# Patient Record
Sex: MALE | Race: WHITE | NOT HISPANIC OR LATINO | URBAN - METROPOLITAN AREA
[De-identification: names, ages, dates, MRNs, and addresses within clinical notes are randomized per-mention and may not be internally consistent; named-entity substitution may affect disease eponyms.]

---

## 2024-01-28 ENCOUNTER — APPOINTMENT (OUTPATIENT)
Dept: CARDIOLOGY | Facility: HOSPITAL | Age: 29
End: 2024-01-28

## 2024-01-28 ENCOUNTER — HOSPITAL ENCOUNTER (EMERGENCY)
Facility: HOSPITAL | Age: 29
Discharge: HOME | End: 2024-01-28
Attending: EMERGENCY MEDICINE
Payer: MEDICAID

## 2024-01-28 VITALS
SYSTOLIC BLOOD PRESSURE: 164 MMHG | TEMPERATURE: 97.7 F | DIASTOLIC BLOOD PRESSURE: 87 MMHG | OXYGEN SATURATION: 98 % | BODY MASS INDEX: 19.13 KG/M2 | RESPIRATION RATE: 15 BRPM | WEIGHT: 121.91 LBS | HEIGHT: 67 IN | HEART RATE: 70 BPM

## 2024-01-28 DIAGNOSIS — F14.10 COCAINE ABUSE (MULTI): ICD-10-CM

## 2024-01-28 DIAGNOSIS — F32.A DEPRESSION, UNSPECIFIED DEPRESSION TYPE: Primary | ICD-10-CM

## 2024-01-28 LAB
ALBUMIN SERPL-MCNC: 4.9 G/DL (ref 3.5–5)
ALP BLD-CCNC: 64 U/L (ref 35–125)
ALT SERPL-CCNC: 23 U/L (ref 5–40)
AMPHETAMINES UR QL SCN>1000 NG/ML: NEGATIVE
ANION GAP SERPL CALC-SCNC: 11 MMOL/L
APPEARANCE UR: CLEAR
AST SERPL-CCNC: 23 U/L (ref 5–40)
BARBITURATES UR QL SCN>300 NG/ML: NEGATIVE
BASOPHILS # BLD AUTO: 0.02 X10*3/UL (ref 0–0.1)
BASOPHILS NFR BLD AUTO: 0.3 %
BENZODIAZ UR QL SCN>300 NG/ML: NEGATIVE
BILIRUB SERPL-MCNC: 0.5 MG/DL (ref 0.1–1.2)
BILIRUB UR STRIP.AUTO-MCNC: NEGATIVE MG/DL
BUN SERPL-MCNC: 12 MG/DL (ref 8–25)
BZE UR QL SCN>300 NG/ML: POSITIVE
CALCIUM SERPL-MCNC: 9.7 MG/DL (ref 8.5–10.4)
CANNABINOIDS UR QL SCN>50 NG/ML: POSITIVE
CHLORIDE SERPL-SCNC: 104 MMOL/L (ref 97–107)
CO2 SERPL-SCNC: 25 MMOL/L (ref 24–31)
COLOR UR: YELLOW
CREAT SERPL-MCNC: 1.1 MG/DL (ref 0.4–1.6)
EGFRCR SERPLBLD CKD-EPI 2021: >90 ML/MIN/1.73M*2
EOSINOPHIL # BLD AUTO: 0.06 X10*3/UL (ref 0–0.7)
EOSINOPHIL NFR BLD AUTO: 1 %
ERYTHROCYTE [DISTWIDTH] IN BLOOD BY AUTOMATED COUNT: 12.4 % (ref 11.5–14.5)
FENTANYL+NORFENTANYL UR QL SCN: NEGATIVE
FLUAV RNA RESP QL NAA+PROBE: NOT DETECTED
FLUBV RNA RESP QL NAA+PROBE: NOT DETECTED
GLUCOSE SERPL-MCNC: 88 MG/DL (ref 65–99)
GLUCOSE UR STRIP.AUTO-MCNC: NORMAL MG/DL
HCT VFR BLD AUTO: 46.3 % (ref 41–52)
HGB BLD-MCNC: 16.1 G/DL (ref 13.5–17.5)
IMM GRANULOCYTES # BLD AUTO: 0.02 X10*3/UL (ref 0–0.7)
IMM GRANULOCYTES NFR BLD AUTO: 0.3 % (ref 0–0.9)
KETONES UR STRIP.AUTO-MCNC: NEGATIVE MG/DL
LEUKOCYTE ESTERASE UR QL STRIP.AUTO: NEGATIVE
LYMPHOCYTES # BLD AUTO: 1.28 X10*3/UL (ref 1.2–4.8)
LYMPHOCYTES NFR BLD AUTO: 21.7 %
MCH RBC QN AUTO: 31 PG (ref 26–34)
MCHC RBC AUTO-ENTMCNC: 34.8 G/DL (ref 32–36)
MCV RBC AUTO: 89 FL (ref 80–100)
METHADONE UR QL SCN>300 NG/ML: NEGATIVE
MONOCYTES # BLD AUTO: 0.32 X10*3/UL (ref 0.1–1)
MONOCYTES NFR BLD AUTO: 5.4 %
MUCOUS THREADS #/AREA URNS AUTO: NORMAL /LPF
NEUTROPHILS # BLD AUTO: 4.21 X10*3/UL (ref 1.2–7.7)
NEUTROPHILS NFR BLD AUTO: 71.3 %
NITRITE UR QL STRIP.AUTO: NEGATIVE
NRBC BLD-RTO: 0 /100 WBCS (ref 0–0)
OPIATES UR QL SCN>300 NG/ML: NEGATIVE
OXYCODONE UR QL: NEGATIVE
PCP UR QL SCN>25 NG/ML: NEGATIVE
PH UR STRIP.AUTO: 6 [PH]
PLATELET # BLD AUTO: 256 X10*3/UL (ref 150–450)
POTASSIUM SERPL-SCNC: 3.9 MMOL/L (ref 3.4–5.1)
PROT SERPL-MCNC: 7.8 G/DL (ref 5.9–7.9)
PROT UR STRIP.AUTO-MCNC: NORMAL MG/DL
RBC # BLD AUTO: 5.2 X10*6/UL (ref 4.5–5.9)
RBC # UR STRIP.AUTO: NEGATIVE /UL
RBC #/AREA URNS AUTO: NORMAL /HPF
SARS-COV-2 RNA RESP QL NAA+PROBE: NOT DETECTED
SODIUM SERPL-SCNC: 140 MMOL/L (ref 133–145)
SP GR UR STRIP.AUTO: 1.03
UROBILINOGEN UR STRIP.AUTO-MCNC: NORMAL MG/DL
WBC # BLD AUTO: 5.9 X10*3/UL (ref 4.4–11.3)
WBC #/AREA URNS AUTO: NORMAL /HPF

## 2024-01-28 PROCEDURE — 85025 COMPLETE CBC W/AUTO DIFF WBC: CPT | Performed by: EMERGENCY MEDICINE

## 2024-01-28 PROCEDURE — 99285 EMERGENCY DEPT VISIT HI MDM: CPT | Performed by: EMERGENCY MEDICINE

## 2024-01-28 PROCEDURE — 36415 COLL VENOUS BLD VENIPUNCTURE: CPT | Performed by: EMERGENCY MEDICINE

## 2024-01-28 PROCEDURE — 80307 DRUG TEST PRSMV CHEM ANLYZR: CPT | Performed by: EMERGENCY MEDICINE

## 2024-01-28 PROCEDURE — 99283 EMERGENCY DEPT VISIT LOW MDM: CPT | Mod: 27

## 2024-01-28 PROCEDURE — 80053 COMPREHEN METABOLIC PANEL: CPT | Performed by: EMERGENCY MEDICINE

## 2024-01-28 PROCEDURE — 81001 URINALYSIS AUTO W/SCOPE: CPT | Performed by: EMERGENCY MEDICINE

## 2024-01-28 PROCEDURE — 93005 ELECTROCARDIOGRAM TRACING: CPT

## 2024-01-28 PROCEDURE — 87636 SARSCOV2 & INF A&B AMP PRB: CPT | Performed by: EMERGENCY MEDICINE

## 2024-01-28 SDOH — HEALTH STABILITY: MENTAL HEALTH: ARE YOU HAVING THOUGHTS OF KILLING YOURSELF RIGHT NOW?: NO

## 2024-01-28 SDOH — HEALTH STABILITY: MENTAL HEALTH: ACTIVE SUICIDAL IDEATION WITH SOME INTENT TO ACT, WITHOUT SPECIFIC PLAN (PAST 1 MONTH): NO

## 2024-01-28 SDOH — HEALTH STABILITY: MENTAL HEALTH: IN THE PAST FEW WEEKS, HAVE YOU FELT THAT YOU OR YOUR FAMILY WOULD BE BETTER OFF IF YOU WERE DEAD?: YES

## 2024-01-28 SDOH — HEALTH STABILITY: MENTAL HEALTH: ACTIVE SUICIDAL IDEATION WITH SPECIFIC PLAN AND INTENT (PAST 1 MONTH): NO

## 2024-01-28 SDOH — ECONOMIC STABILITY: HOUSING INSECURITY: FEELS SAFE LIVING IN HOME: YES

## 2024-01-28 SDOH — HEALTH STABILITY: MENTAL HEALTH: IN THE PAST WEEK, HAVE YOU BEEN HAVING THOUGHTS ABOUT KILLING YOURSELF?: YES

## 2024-01-28 SDOH — HEALTH STABILITY: MENTAL HEALTH: ANXIETY SYMPTOMS: GENERALIZED

## 2024-01-28 SDOH — HEALTH STABILITY: MENTAL HEALTH: WHEN DID YOU TRY TO KILL YOURSELF?: 2019

## 2024-01-28 SDOH — HEALTH STABILITY: MENTAL HEALTH: HOW DID YOU TRY TO KILL YOURSELF?: OVERDOSE

## 2024-01-28 SDOH — HEALTH STABILITY: MENTAL HEALTH: SUICIDAL BEHAVIOR (DESCRIPTION): OVERDOSE ON PILLS

## 2024-01-28 SDOH — HEALTH STABILITY: MENTAL HEALTH: HAVE YOU EVER TRIED TO KILL YOURSELF?: YES

## 2024-01-28 SDOH — HEALTH STABILITY: MENTAL HEALTH: NON-SPECIFIC ACTIVE SUICIDAL THOUGHTS (PAST 1 MONTH): YES

## 2024-01-28 SDOH — HEALTH STABILITY: MENTAL HEALTH: SUICIDAL BEHAVIOR (3 MONTHS): NO

## 2024-01-28 SDOH — HEALTH STABILITY: MENTAL HEALTH: DEPRESSION SYMPTOMS: ISOLATIVE;INCREASED IRRITABILITY

## 2024-01-28 SDOH — HEALTH STABILITY: MENTAL HEALTH: WISH TO BE DEAD (PAST 1 MONTH): YES

## 2024-01-28 SDOH — HEALTH STABILITY: MENTAL HEALTH: IN THE PAST FEW WEEKS, HAVE YOU WISHED YOU WERE DEAD?: YES

## 2024-01-28 SDOH — HEALTH STABILITY: MENTAL HEALTH: SUICIDAL BEHAVIOR (LIFETIME): YES

## 2024-01-28 ASSESSMENT — PAIN - FUNCTIONAL ASSESSMENT: PAIN_FUNCTIONAL_ASSESSMENT: 0-10

## 2024-01-28 ASSESSMENT — COLUMBIA-SUICIDE SEVERITY RATING SCALE - C-SSRS
1. IN THE PAST MONTH, HAVE YOU WISHED YOU WERE DEAD OR WISHED YOU COULD GO TO SLEEP AND NOT WAKE UP?: YES
5. HAVE YOU STARTED TO WORK OUT OR WORKED OUT THE DETAILS OF HOW TO KILL YOURSELF? DO YOU INTEND TO CARRY OUT THIS PLAN?: NO
6. HAVE YOU EVER DONE ANYTHING, STARTED TO DO ANYTHING, OR PREPARED TO DO ANYTHING TO END YOUR LIFE?: NO
2. HAVE YOU ACTUALLY HAD ANY THOUGHTS OF KILLING YOURSELF?: YES
4. HAVE YOU HAD THESE THOUGHTS AND HAD SOME INTENTION OF ACTING ON THEM?: NO

## 2024-01-28 ASSESSMENT — PAIN SCALES - GENERAL: PAINLEVEL_OUTOF10: 0 - NO PAIN

## 2024-01-28 ASSESSMENT — LIFESTYLE VARIABLES
SUBSTANCE_ABUSE_PAST_12_MONTHS: YES
PRESCIPTION_ABUSE_PAST_12_MONTHS: NO

## 2024-01-28 NOTE — PROGRESS NOTES
"EPAT - Social Work Psychiatric Assessment    Arrival Details  Mode of Arrival: Ambulatory (Escorted by [facility])  Admission Source: Home  Admission Type: Involuntary (Pink slip written by [facility])  EPAT Assessment Start Date: 01/28/24  EPAT Assessment Start Time: 1042  Name of : MUSTAPHA Tadeo    History of Present Illness  Admission Reason: SI  HPI: Pt is a 29 y/o M brought in by police after making SI statements to sister over the phone. Per report sister called 911 stating she was concerned pt was unsafe with self. Per police pt had reported to them that he took several unknown pills because he \"wanted the pain to stop\". Pt admitted SI to RN upon arrival to ED but denied any attempt or plan today. Social work reviewed Pts chart, medical record, and provider notes which indicate history of depression, anxiety, PTSD, NISHA and SI with a past attempt in 2019. Pt has been hospitalized several times since 2018 for SI. He has been with outpatient providers in the past but denies any current. Pt states he has been off medication since 2019. The triage risk assessment was reviewed and Pt was inidcated to be moderate risk during triage assessment.    SW Readmission Information   Readmission within 30 Days: No    Psychiatric Symptoms  Anxiety Symptoms: Generalized  Depression Symptoms: Isolative, Increased irritability  Tabitha Symptoms: No problems reported or observed.    Psychosis Symptoms  Hallucination Type: No problems reported or observed.  Delusion Type: No problems reported or observed.    Additional Symptoms - Adult  Generalized Anxiety Disorder: No problems reported or observed.  Obsessive Compulsive Disorder: No problems reported or observed.  Panic Attack: No problems reported or observed.  Post Traumatic Stress Disorder: No problems reported or observed.  Delirium: No problems reported or observed.    Past Psychiatric History/Meds/Treatments  Past Psychiatric History: Pt has been diagnosed with depression, " anxiety, PTSD and has history with substance use. Pt reports providers have tried to diagnose him with schizoprenia however he denies any history or current symptoms of the diagnosis.  Past Psychiatric Meds/Treatments: Pt has been hospitalized several times in the past. Most recent admission per records is to [facility] in August of 2022 for SI. Pt has also been to [facility], [facility] and [facility] between 2018 and 2019.  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 He has history of cocaine, marijuana, opioids and alcohol use.)  Have you used any prescription drugs other than prescribed in the past 12 months?: No  Is a toxicology screen needed?: Yes    Stage of Change  Stage of Change: Precontemplation  History of Treatment: Other (Comment) (admits to seeking outpatient treatment in the past.)  Type of Treatment Offered: Inpatient  Treatment Offered:  "Declined  Duration of Substance Use: Several years  Frequency of Substance Use: Pt reports occassional use    Psychosocial  Psychosocial (WDL): Within Defined Limits  Affect: Appropriate to circumstances    Orientation  Orientation Level: Oriented X4    General Appearance  Motor Activity: Unremarkable  Speech Pattern: Other (Comment) (Clear)  General Attitude: Cooperative, Guarded  Appearance/Hygiene: Unremarkable    Thought Process  Coherency: Circumstantial  Content: Unremarkable  Perception: Not altered  Hallucination: None  Judgment/Insight: Poor  Confusion: None  Cognition: Poor judgement    Sleep Pattern  Sleep Pattern: Unable to assess    Risk Factors  Self Harm/Suicidal Ideation Plan: Pt admitting to making statements to sister over the phone but minimizing thoughts at this time. Pt denies any plan however there was report of pt making comments to police about taking unknown pills.  Previous Self Harm/Suicidal Plans: Pt has history of SI and a past attempt by overdose on Xanax in 2019.  Risk Factors: Lower socioeconomic status, Major mental illness, Male, Substance abuse  Description of Thoughts/Ideas Leaving Unit Now: Pt not able to guarantee safety at this time stating he is normally by himself and this is 'how he prefers it\". Pt not allowing staff to call sister or roommate for collateral information or safety planning.    Violence Risk Assessment  Assessment of Violence: None noted  Thoughts of Harm to Others: No    Ability to Assess Risk Screen  Risk Screen - Ability to Assess: Able to be screened  Ask Suicide-Screening Questions  1. In the past few weeks, have you wished you were dead?: Yes  2. In the past few weeks, have you felt that you or your family would be better off if you were dead?: Yes  3. In the past week, have you been having thoughts about killing yourself?: Yes  4. Have you ever tried to kill yourself?: Yes  How did you try to kill yourself?: Overdose  When did you try to kill yourself?: " 2019  5. Are you having thoughts of killing yourself right now?: No  Calculated Risk Score: Potential Risk  Thomas Suicide Severity Rating Scale (Screener/Recent Self-Report)  1. Wish to be Dead (Past 1 Month): Yes  2. Non-Specific Active Suicidal Thoughts (Past 1 Month): Yes  3. Active Suicidal Ideation with any Methods (Not Plan) Without Intent to Act (Past 1 Month): Yes  4. Active Suicidal Ideation with Some Intent to Act, Without Specific Plan (Past 1 Month): No  5. Active Suicidal Ideation with Specific Plan and Intent (Past 1 Month): No  6. Suicidal Behavior (Lifetime): Yes  6. Suicidal Behavior (3 Months): No  6. Suicidal Behavior (Description): Overdose on pills  Calculated C-SSRS Risk Score (Lifetime/Recent): Moderate Risk  Step 1: Risk Factors  Current & Past Psychiatric Dx: Mood disorder, Alcohol/substance abuse disorders, PTSD  Presenting Symptoms: Impulsivity, Anxiety and/or panic  Precipitants/Stressors: Triggering events leading to humiliation, shame, and/or despair (e.g. loss of relationship, financial or health status) (real or anticipated), Substance intoxication or withdrawal, Inadequate social supports, Social isolation  Change in Treatment: Non-compliant or not receiving treatment  Access to Lethal Methods : No  Step 2: Protective Factors   Protective Factors Internal: Identifies reasons for living  Protective Factors External: Engaged in work or school  Step 3: Suicidal Ideation Intensity  How Many Times Have You Had These Thoughts: 2-5 times in a week  When You Have the Thoughts How Long do They Last : Less than 1 hour/some of the time  Could/Can You Stop Thinking About Killing Yourself or Wanting to Die if You Want to: Can control thoughts with some difficulty  Are There Things - Anyone or Anything - That Stopped You From Wanting to Die or Acting on: Uncertain that deterrents stopped you  What Sort of Reasons Did You Have For Thinking About Wanting to Die or Killing Yourself: Mostly to end  "or stop the pain (you couldn't go on living with the pain or how you were feeling)  Total Score: 15  Step 5: Documentation  Risk Level: Moderate suicide risk    Psychiatric Impression and Plan of Care  Assessment and Plan: Upon assessment Pt presents calm but frustrated with situation. Pt admits to making statements to sister over the phone of \"being tired of life\" and \"wanting the pain to stop\". Pt states he got into an argument with some girl recently and made comments \"out of anger\" and was trying to vent to his sister which \"was the wrong thing to do\". Pt admits he has been off Abilify since 2019 after he was incarcerated and has not gotten back on it or any medications since because he \"wants to try and be mentally stable on his own\". Pt reports he has been diagnosed with depression, anxiety, and PTSD. He reports providers have also tried to diagnosis him with schizophrenia because \"they believed I had symptoms of hallucinatiosns\" however pt denies this stating the only thing he has ever experienced were \"his own thoughts in his head\". Pt denies ever experiencing hallucinations. Pt admits he has been feeling depressed latelt but states these symtoms are \"not going to change\". Pt minimizing SI statements and reports that he never told police he took pills and believes it was his sister that made this statement to police. Pt denies SI at time of assessment and continues to minimize symptoms while in the ED. Pt repeatedly stating he wants to go home because he \"wants to be able to get to work later tonight\". Pt refusing to allow social work to contact sister or roommate for collateral information or safety planning stating his sister is the one who called police on him and his roommate is never home due to opposite shifts at work and the fact that his roommate travels at times fot work. Pt denies HI/AH/VH but continues to minimize SI and is unable to guarantee safety at this time. He presents with moderate to high " risk at time of social work assessment and plan will be to refer pt to inpatient placement for safety and stabilization.  Specific Resources Provided to Patient: Peer support unavailable.    Outcome/Disposition  Patient's Perception of Outcome Achieved: Pt disagrees with inpatient treatment.  Assessment, Recommendations and Risk Level Reviewed with: Reviewed case with Dr. Martins and JUAN CARLOS Alvarado. Due to pt's lack of safety at this time all parties agree for inpatient placement.  EPAT Assessment Completed Date: 01/28/24  EPAT Assessment Completed Time: 1058  Patient Disposition: Out of network facility (Specify)  Out of Network Reason: Insurance not accepted at  facility (Pt is uninsured at this time and will be [facility] to [facility].)    Social Work Note

## 2024-01-28 NOTE — ED PROVIDER NOTES
"HPI   Chief Complaint   Patient presents with    Suicidal     Pt stated that his sister called 911 because she was nervous that he was going to hurt himself. He stated that he is SI but he does not have a plan. He stated that he does \"not have the balls to actually do it\". He stated that he does not follow up with a therapist but does have the dx of schizophrenia and depression. He stopped taking his abilify in 2019 when he was incarcerated. Pt was brought in by PD and is pink slipped.        28-year-old male with history of depression and suicidal ideation presents after suicidal statements to family.  Patient takes medications for depression.  He was very angry and said he did not want to live anymore according to the pink slip.  He patient says that it was taken out of context.  That he has suicidal ideations but no plan.  Denies alcohol but used cocaine last night.    No headache or neck pain.  No chest back or abdominal pain.  No difficulty breathing.  No self injury or harm today.    Viewed EMR for past visits.  Since has been seen for depression in the past    Refused contact with sister and friends.      History provided by:  Patient                      Faby Coma Scale Score: 15                  Patient History   No past medical history on file.  No past surgical history on file.  No family history on file.  Social History     Tobacco Use    Smoking status: Not on file    Smokeless tobacco: Not on file   Substance Use Topics    Alcohol use: Not on file    Drug use: Not on file       Physical Exam   ED Triage Vitals [01/28/24 0951]   Temperature Heart Rate Respirations BP   36.8 °C (98.2 °F) 70 16 (!) 178/92      Pulse Ox Temp Source Heart Rate Source Patient Position   95 % Oral Monitor Sitting      BP Location FiO2 (%)     Right arm --       Physical Exam  Vitals and nursing note reviewed.     Constitutional: Well appearing and hydrated. Awake & alert. No acute distress.  Head: Atraumatic.  Eyes: " Sclerae are anicteric and not injected.  ENT: Airway is patent.  Neck: Neck is supple and non-tender. No stridor.  Cardiovascular: Regular rate.  Pulmonary/Chest: Clear to auscultation bilaterally. No distress.  GI: Soft and non-distended. There is no discomfort with palpation.   Extremities: Full range of motion in all extremities and no deformity.  Neurological: Alert, awake.  Moving all extremities.  Skin: Skin is warm and dry.  Psychiatric: Cooperative.  Not angry during exam.  Depressed with suicidal ideations.  Denies plan or attempt.    EKG:  interpreted by me, Emergency Department Physician    Sinus bradycardia 57, no ST elevations, incomplete right bundle branch block, QRS is 387, no prior, interpreted at 1022    ED Course & MDM   ED Course as of 01/28/24 1451   Sun Jan 28, 2024   1100 COVID and flu negative.  White count is 5.3, hemoglobin is 16.1, UA unremarkable, CMP unremarkable.  Labs [RF]   1101  evaluated interpreted by me [RF]      ED Course User Index  [RF] Sawyer Martins MD         Diagnoses as of 01/28/24 1451   Cocaine abuse (CMS/[facility])   Depression, unspecified depression type       Medical Decision Making  Patient is medically cleared for evaluation by Psychiatry.  Does not have any acute medical conditions that would interfere with evaluation treatment by Psychiatry at this time. Interviewed and examined patient.  Reviewed labs.  No significant metabolic or electrolyte abnormality. No signs of infection or trauma.     Patient seen by clinical  who is in process of finding placement in a psychiatric facility for further evaluation and treatment    Clinical  has reached out to patient's family.  Mother says that the patient frequently expresses his anger as sentiments that he does not want to live and this is typical for patient.  She feels he is safe to go home.  Clinical  also spoke with patient's roommate who lives and works with patient.   Roommate feels the patient is at his baseline depression and his behavior was not uncommon for anger.  Does not feel that he is unsafe to go home.   was able to contract for safety.  Patient has been saying since the beginning that he is not suicidal and that his behavior is typical of his anger outburst.    Patient will be discharged home.  Will continue his outpatient medications for depression.  Will return to the ER if he has increased depression, suicidal thoughts or wants inpatient treatment.    Discharged home with roommate.            Procedure  Procedures     Sawyer Martins MD  01/28/24 1434       Sawyer Martins MD  01/28/24 9502

## 2024-01-28 NOTE — PROGRESS NOTES
"Social Work Note    13:23-13:40 [facility] mom Diamond Zhu ([phone]) who provided collateral that pt is not a danger to self. He makes comments to family and friends trying to \"get attention\" and will tend to do this more often when he gets frustrated or mad. Social work asked mom if there was someone, possibly even her, that could verify his safety going home and that would be with him to be able to monitor safety. Mom confirmed that pt's girlfriend and kids are living at the house with him and his friend and she is there all the time. Mom did not have girlfriend's phone number.   Social work spoke with pt after speaking with mom. Pt states he does not have a girlfriend or kids but his friend/roommate does and his friend's girlfriend does stay with them. Pt does not know her number but states he wants to try and call family and friends to see if he can get the number.   Social work told pt if friend's girlfriend is able to contact the hospital social work will speak with her to confirm she is living at the house and see if a safety plan can be obtained. Social work informed Dr. Gross who is in agreement.     14:42-14:48 [facility] pt's roommate/friend Benjamín ([phone]). Benjamín confirms pt lives with him and he is with pt everyday. Benjamín confirms he will be able to watch pt 24/7 because they also work together for the same company. Benjamín denies any issues or concerns regarding pt wanting to harm himself. Benjamín admits pt has struggled with depression and anxiety but he feels pt is safe with self and others to return home. Benjamín confirms that he is finishing another job in [place] at this time but will be able to pick pt up from ED himself and take him home as long as we are comfortable discharging into his care. Social work confirmed safety plan with Dr. Gross who is in agreement to discharge home with friend/roommate who will be watching pt 24/7.   Social work attempted to contact Benjamín back to inform " him of pt's discharge. Left message.   Social work called [facility] to cancel referral at this time.

## 2024-01-28 NOTE — DISCHARGE INSTRUCTIONS
Continue your medications for depression.  If you feel that you need treatment for depression return to the ER.  If you want detox for cocaine return to the ER.

## 2024-02-07 ENCOUNTER — HOSPITAL ENCOUNTER (EMERGENCY)
Facility: HOSPITAL | Age: 29
Discharge: OTHER INSTITUTION | End: 2024-02-09
Attending: EMERGENCY MEDICINE
Payer: MEDICAID

## 2024-02-07 ENCOUNTER — APPOINTMENT (OUTPATIENT)
Dept: CARDIOLOGY | Facility: HOSPITAL | Age: 29
End: 2024-02-07
Payer: MEDICAID

## 2024-02-07 DIAGNOSIS — F19.10 POLYSUBSTANCE ABUSE (MULTI): ICD-10-CM

## 2024-02-07 DIAGNOSIS — R45.851 SUICIDAL IDEATIONS: Primary | ICD-10-CM

## 2024-02-07 LAB
ALBUMIN SERPL-MCNC: 4.6 G/DL (ref 3.5–5)
ALP BLD-CCNC: 59 U/L (ref 35–125)
ALT SERPL-CCNC: 20 U/L (ref 5–40)
AMPHETAMINES UR QL SCN>1000 NG/ML: NEGATIVE
ANION GAP SERPL CALC-SCNC: 11 MMOL/L
APPEARANCE UR: ABNORMAL
AST SERPL-CCNC: 18 U/L (ref 5–40)
BARBITURATES UR QL SCN>300 NG/ML: NEGATIVE
BASOPHILS # BLD AUTO: 0.02 X10*3/UL (ref 0–0.1)
BASOPHILS NFR BLD AUTO: 0.5 %
BENZODIAZ UR QL SCN>300 NG/ML: NEGATIVE
BILIRUB SERPL-MCNC: 0.7 MG/DL (ref 0.1–1.2)
BILIRUB UR STRIP.AUTO-MCNC: NEGATIVE MG/DL
BUN SERPL-MCNC: 14 MG/DL (ref 8–25)
BZE UR QL SCN>300 NG/ML: POSITIVE
CALCIUM SERPL-MCNC: 9.4 MG/DL (ref 8.5–10.4)
CANNABINOIDS UR QL SCN>50 NG/ML: POSITIVE
CHLORIDE SERPL-SCNC: 106 MMOL/L (ref 97–107)
CO2 SERPL-SCNC: 24 MMOL/L (ref 24–31)
COLOR UR: YELLOW
CREAT SERPL-MCNC: 1.3 MG/DL (ref 0.4–1.6)
EGFRCR SERPLBLD CKD-EPI 2021: 77 ML/MIN/1.73M*2
EOSINOPHIL # BLD AUTO: 0.13 X10*3/UL (ref 0–0.7)
EOSINOPHIL NFR BLD AUTO: 3 %
ERYTHROCYTE [DISTWIDTH] IN BLOOD BY AUTOMATED COUNT: 12.4 % (ref 11.5–14.5)
ETHANOL SERPL-MCNC: <0.01 G/DL
FENTANYL+NORFENTANYL UR QL SCN: NEGATIVE
FLUAV RNA RESP QL NAA+PROBE: NOT DETECTED
FLUBV RNA RESP QL NAA+PROBE: NOT DETECTED
GLUCOSE SERPL-MCNC: 86 MG/DL (ref 65–99)
GLUCOSE UR STRIP.AUTO-MCNC: NORMAL MG/DL
HCT VFR BLD AUTO: 44.8 % (ref 41–52)
HGB BLD-MCNC: 15.7 G/DL (ref 13.5–17.5)
IMM GRANULOCYTES # BLD AUTO: 0.01 X10*3/UL (ref 0–0.7)
IMM GRANULOCYTES NFR BLD AUTO: 0.2 % (ref 0–0.9)
KETONES UR STRIP.AUTO-MCNC: NEGATIVE MG/DL
LEUKOCYTE ESTERASE UR QL STRIP.AUTO: NEGATIVE
LYMPHOCYTES # BLD AUTO: 1.19 X10*3/UL (ref 1.2–4.8)
LYMPHOCYTES NFR BLD AUTO: 27.4 %
MCH RBC QN AUTO: 30.4 PG (ref 26–34)
MCHC RBC AUTO-ENTMCNC: 35 G/DL (ref 32–36)
MCV RBC AUTO: 87 FL (ref 80–100)
METHADONE UR QL SCN>300 NG/ML: NEGATIVE
MONOCYTES # BLD AUTO: 0.28 X10*3/UL (ref 0.1–1)
MONOCYTES NFR BLD AUTO: 6.5 %
MUCOUS THREADS #/AREA URNS AUTO: NORMAL /LPF
NEUTROPHILS # BLD AUTO: 2.71 X10*3/UL (ref 1.2–7.7)
NEUTROPHILS NFR BLD AUTO: 62.4 %
NITRITE UR QL STRIP.AUTO: NEGATIVE
NRBC BLD-RTO: 0 /100 WBCS (ref 0–0)
OPIATES UR QL SCN>300 NG/ML: NEGATIVE
OXYCODONE UR QL: NEGATIVE
PCP UR QL SCN>25 NG/ML: NEGATIVE
PH UR STRIP.AUTO: 6.5 [PH]
PLATELET # BLD AUTO: 230 X10*3/UL (ref 150–450)
POTASSIUM SERPL-SCNC: 3.9 MMOL/L (ref 3.4–5.1)
PROT SERPL-MCNC: 7.5 G/DL (ref 5.9–7.9)
PROT UR STRIP.AUTO-MCNC: ABNORMAL MG/DL
RBC # BLD AUTO: 5.16 X10*6/UL (ref 4.5–5.9)
RBC # UR STRIP.AUTO: NEGATIVE /UL
RBC #/AREA URNS AUTO: NORMAL /HPF
SARS-COV-2 RNA RESP QL NAA+PROBE: NOT DETECTED
SODIUM SERPL-SCNC: 141 MMOL/L (ref 133–145)
SP GR UR STRIP.AUTO: 1.04
UROBILINOGEN UR STRIP.AUTO-MCNC: ABNORMAL MG/DL
WBC # BLD AUTO: 4.3 X10*3/UL (ref 4.4–11.3)
WBC #/AREA URNS AUTO: NORMAL /HPF

## 2024-02-07 PROCEDURE — 99285 EMERGENCY DEPT VISIT HI MDM: CPT | Mod: 25 | Performed by: EMERGENCY MEDICINE

## 2024-02-07 PROCEDURE — 93005 ELECTROCARDIOGRAM TRACING: CPT

## 2024-02-07 PROCEDURE — 87636 SARSCOV2 & INF A&B AMP PRB: CPT | Performed by: EMERGENCY MEDICINE

## 2024-02-07 PROCEDURE — 82077 ASSAY SPEC XCP UR&BREATH IA: CPT | Performed by: EMERGENCY MEDICINE

## 2024-02-07 PROCEDURE — 36415 COLL VENOUS BLD VENIPUNCTURE: CPT | Performed by: EMERGENCY MEDICINE

## 2024-02-07 PROCEDURE — 85025 COMPLETE CBC W/AUTO DIFF WBC: CPT | Performed by: EMERGENCY MEDICINE

## 2024-02-07 PROCEDURE — 80307 DRUG TEST PRSMV CHEM ANLYZR: CPT | Performed by: EMERGENCY MEDICINE

## 2024-02-07 PROCEDURE — 81001 URINALYSIS AUTO W/SCOPE: CPT | Mod: 59 | Performed by: EMERGENCY MEDICINE

## 2024-02-07 PROCEDURE — 80053 COMPREHEN METABOLIC PANEL: CPT | Performed by: EMERGENCY MEDICINE

## 2024-02-07 SDOH — HEALTH STABILITY: MENTAL HEALTH: NON-SPECIFIC ACTIVE SUICIDAL THOUGHTS (PAST 1 MONTH): YES

## 2024-02-07 SDOH — HEALTH STABILITY: MENTAL HEALTH: IN THE PAST FEW WEEKS, HAVE YOU FELT THAT YOU OR YOUR FAMILY WOULD BE BETTER OFF IF YOU WERE DEAD?: YES

## 2024-02-07 SDOH — HEALTH STABILITY: MENTAL HEALTH: DESCRIBE YOUR THOUGHTS OF KILLING YOURSELF RIGHT NOW:: NOT WANTING TO BE ALIVE ANYMORE, DENIES PLAN

## 2024-02-07 SDOH — HEALTH STABILITY: MENTAL HEALTH: IN THE PAST WEEK, HAVE YOU BEEN HAVING THOUGHTS ABOUT KILLING YOURSELF?: YES

## 2024-02-07 SDOH — ECONOMIC STABILITY: HOUSING INSECURITY: FEELS SAFE LIVING IN HOME: NO

## 2024-02-07 SDOH — HEALTH STABILITY: MENTAL HEALTH: DEPRESSION SYMPTOMS: FEELINGS OF WORTHLESSNESS;FEELINGS OF HOPELESSESS;FEELINGS OF HELPLESSNESS

## 2024-02-07 SDOH — HEALTH STABILITY: MENTAL HEALTH: IN THE PAST FEW WEEKS, HAVE YOU WISHED YOU WERE DEAD?: YES

## 2024-02-07 SDOH — HEALTH STABILITY: MENTAL HEALTH: ANXIETY SYMPTOMS: GENERALIZED

## 2024-02-07 SDOH — HEALTH STABILITY: MENTAL HEALTH: HOW DID YOU TRY TO KILL YOURSELF?: METHOD UNKNOWN

## 2024-02-07 SDOH — HEALTH STABILITY: MENTAL HEALTH: ACTIVE SUICIDAL IDEATION WITH SOME INTENT TO ACT, WITHOUT SPECIFIC PLAN (PAST 1 MONTH): NO

## 2024-02-07 SDOH — HEALTH STABILITY: MENTAL HEALTH: SUICIDAL BEHAVIOR (LIFETIME): YES

## 2024-02-07 SDOH — HEALTH STABILITY: MENTAL HEALTH: ARE YOU HAVING THOUGHTS OF KILLING YOURSELF RIGHT NOW?: YES

## 2024-02-07 SDOH — HEALTH STABILITY: MENTAL HEALTH: SUICIDAL BEHAVIOR (3 MONTHS): NO

## 2024-02-07 SDOH — HEALTH STABILITY: MENTAL HEALTH: NEEDS EXPRESSED: EMOTIONAL

## 2024-02-07 SDOH — HEALTH STABILITY: MENTAL HEALTH: HAVE YOU EVER TRIED TO KILL YOURSELF?: YES

## 2024-02-07 SDOH — HEALTH STABILITY: MENTAL HEALTH: WISH TO BE DEAD (PAST 1 MONTH): YES

## 2024-02-07 SDOH — HEALTH STABILITY: MENTAL HEALTH: BEHAVIORS/MOOD: COOPERATIVE

## 2024-02-07 SDOH — HEALTH STABILITY: MENTAL HEALTH: WHEN DID YOU TRY TO KILL YOURSELF?: REPORTS MULTIPLE PREVIOUS ATTEMPTS

## 2024-02-07 SDOH — HEALTH STABILITY: MENTAL HEALTH: ACTIVE SUICIDAL IDEATION WITH SPECIFIC PLAN AND INTENT (PAST 1 MONTH): NO

## 2024-02-07 ASSESSMENT — COLUMBIA-SUICIDE SEVERITY RATING SCALE - C-SSRS
2. HAVE YOU ACTUALLY HAD ANY THOUGHTS OF KILLING YOURSELF?: YES
1. IN THE PAST MONTH, HAVE YOU WISHED YOU WERE DEAD OR WISHED YOU COULD GO TO SLEEP AND NOT WAKE UP?: YES
6. HAVE YOU EVER DONE ANYTHING, STARTED TO DO ANYTHING, OR PREPARED TO DO ANYTHING TO END YOUR LIFE?: NO
4. HAVE YOU HAD THESE THOUGHTS AND HAD SOME INTENTION OF ACTING ON THEM?: NO
5. HAVE YOU STARTED TO WORK OUT OR WORKED OUT THE DETAILS OF HOW TO KILL YOURSELF? DO YOU INTEND TO CARRY OUT THIS PLAN?: NO
6. HAVE YOU EVER DONE ANYTHING, STARTED TO DO ANYTHING, OR PREPARED TO DO ANYTHING TO END YOUR LIFE?: YES

## 2024-02-07 ASSESSMENT — PAIN - FUNCTIONAL ASSESSMENT: PAIN_FUNCTIONAL_ASSESSMENT: 0-10

## 2024-02-07 ASSESSMENT — LIFESTYLE VARIABLES
PRESCIPTION_ABUSE_PAST_12_MONTHS: YES
SUBSTANCE_ABUSE_PAST_12_MONTHS: YES

## 2024-02-07 ASSESSMENT — PAIN SCALES - GENERAL: PAINLEVEL_OUTOF10: 0 - NO PAIN

## 2024-02-07 NOTE — PROGRESS NOTES
EPAT - Social Work Psychiatric Assessment    Arrival Details  Mode of Arrival: Ambulance  Admission Source: Other (Comment) ([facility])  Admission Type: Voluntary (pink slip written by GALE)  EPAT Assessment Start Date: 02/07/24  EPAT Assessment Start Time: 1452  Name of : Brenda Mason LPC    History of Present Illness  Admission Reason: SI, substance abuse  HPI: Pt is a 27y/o presenting to [facility] ED from [facility] for psychiatric evaluation. Pt chart, triage and provider notes reviewed prior to assessment, nursing notes reflect moderate risk. Pt shares that he has been using percocet, cocaine and Xanax daily. Pt reports SI related to substance abuse and inability to take care of mental health or stop use. According to pt, he does not have a plan to hurt himself at this time but is not able to manage SI effectively. Pt was seen in ED for SI ten days ago, was discharged at that time but no relief of symptoms since then. Pt shares history of MDD, ESTRELLITA, PTSD but is not engaged in any mental health treatment, denies use of prescribed medications. Pt reports he struggles in his current environment due to living w/ others and presence of drugs. Pt believes he is too afraid to act on SI, however, does not know how to cope w/ or manage symptoms.     Readmission Information   Readmission within 30 Days: Yes  Previous ED Visit Date and Reason : 1/28/24, SI  Previous Discharge Date and Location: 1/28/24  Factors Contributing to  Readmission Inpatient/ED (Team Perspective): Lack of Community Support, Substance Abuse, Discharge Plan Did Not Meet Patient Needs  Readmission From: Home  Did You Have Follow-Up Appointments Scheduled: No    Psychiatric Symptoms  Anxiety Symptoms: Generalized  Depression Symptoms: Feelings of worthlessness, Feelings of hopelessess, Feelings of helplessness  Tabitha Symptoms: No problems reported or observed.    Psychosis Symptoms  Hallucination Type: No problems reported or  "observed.  Delusion Type: No problems reported or observed.    Additional Symptoms - Adult  Generalized Anxiety Disorder: Excessive anxiety/worry, Restlessness  Obsessive Compulsive Disorder: No problems reported or observed.  Panic Attack: No problems reported or observed.  Post Traumatic Stress Disorder: Traumatic event  Delirium: No problems reported or observed.    Past Psychiatric History/Meds/Treatments  Past Psychiatric History: MDD, ESTRELLITA, PTSD, substance use  Past Psychiatric Meds/Treatments: Pt denies current treatment, reports history of multiple inpatient admissions for SI.  Past Violence/Victimization History: Denies    Current Mental Health Contacts   Name/Phone Number: N/A  Provider Name/Phone Number: N/A         Living Arrangement: Lives with someone    Home Safety  Feels Safe Living in Home: No (Pt asked if feeling safe in the home, responds \"no\" due to presence of drugs.)  Potentially Unsafe Housing Conditions: Unable to Assess  Home Safety : No concerns reported.                   Legal  Legal Considerations: Patient/ Family Ability to Make Healthcare Decisions  Criminal Activity/ Legal Involvement Pertinent to Current Situation/ Hospitalization: None reported  Legal Concerns: None reported    Drug Screening  Have you used any substances (canabis, cocaine, heroin, hallucinogens, inhalants, etc.) in the past 12 months?: Yes  Have you used any prescription drugs other than prescribed in the past 12 months?: Yes  Is a toxicology screen needed?: Yes (tox positive for THC and cocaine)    Stage of Change  Stage of Change: Preparation  History of Treatment: Inpatient  Type of Treatment Offered: Inpatient  Treatment Offered: Accepted  Frequency of Substance Use: daily  Age of First Substance Use: Unk    Psychosocial  Psychosocial (WDL): Within Defined Limits  Behaviors/Mood: Anxious, Cooperative, Pleasant  Affect: Appropriate to circumstances  Needs Expressed: " Emotional    Orientation  Orientation Level: Oriented X4, Appropriate for developmental age    General Appearance  Motor Activity: Restlessness  Speech Pattern: Other (Comment) (Unremarkable)  General Attitude: Cooperative, Attentive, Pleasant  Appearance/Hygiene: Unremarkable    Thought Process  Coherency: Other (Comment) (unremarkable)  Content: Unremarkable  Delusions: Other (Comment) (none reported)  Perception: Not altered  Hallucination: None  Judgment/Insight: Poor  Confusion: None  Cognition: Appropriate attention/concentration, Appropriate for developmental age, Poor judgement, Poor safety awareness         Risk Factors  Self Harm/Suicidal Ideation Plan: Reports current thoughts of SI, denies plans to act at this time  Previous Self Harm/Suicidal Plans: pt reports previous attempts but that he sought help  Risk Factors: Male, Substance abuse  Description of Thoughts/Ideas Leaving Unit Now: reports current SI    Violence Risk Assessment  Assessment of Violence: None noted  Thoughts of Harm to Others: No    Ability to Assess Risk Screen  Risk Screen - Ability to Assess: Able to be screened  Ask Suicide-Screening Questions  1. In the past few weeks, have you wished you were dead?: Yes  2. In the past few weeks, have you felt that you or your family would be better off if you were dead?: Yes  3. In the past week, have you been having thoughts about killing yourself?: Yes  4. Have you ever tried to kill yourself?: Yes  How did you try to kill yourself?: method unknown  When did you try to kill yourself?: reports multiple previous attempts  5. Are you having thoughts of killing yourself right now?: Yes  Describe your thoughts of killing yourself right now:: not wanting to be alive anymore, denies plan  Calculated Risk Score: Imminent Risk  [place] Suicide Severity Rating Scale (Screener/Recent Self-Report)  1. Wish to be Dead (Past 1 Month): Yes  2. Non-Specific Active Suicidal Thoughts (Past 1 Month): Yes  3.  Active Suicidal Ideation with any Methods (Not Plan) Without Intent to Act (Past 1 Month): No  4. Active Suicidal Ideation with Some Intent to Act, Without Specific Plan (Past 1 Month): No  5. Active Suicidal Ideation with Specific Plan and Intent (Past 1 Month): No  6. Suicidal Behavior (Lifetime): Yes  6. Suicidal Behavior (3 Months): No  Calculated C-SSRS Risk Score (Lifetime/Recent): Moderate Risk  Step 1: Risk Factors  Current & Past Psychiatric Dx: Mood disorder, Alcohol/substance abuse disorders, PTSD  Presenting Symptoms: Hopelessness or despair, Anxiety and/or panic  Precipitants/Stressors: Triggering events leading to humiliation, shame, and/or despair (e.g. loss of relationship, financial or health status) (real or anticipated), Substance intoxication or withdrawal  Change in Treatment: Non-compliant or not receiving treatment  Access to Lethal Methods : Yes (Pt states there are weapons in the home)  Step 2: Protective Factors   Protective Factors Internal: Fear of death or the actual act of killing self  Step 3: Suicidal Ideation Intensity  Most Severe Suicidal Ideation Identified: reports multiple previous attempts  How Many Times Have You Had These Thoughts: Many times each day  When You Have the Thoughts How Long do They Last : 4-8 hours/most of the day  Could/Can You Stop Thinking About Killing Yourself or Wanting to Die if You Want to: Can control thoughts with a lot of difficulty  Are There Things - Anyone or Anything - That Stopped You From Wanting to Die or Acting on: Uncertain that deterrents stopped you  What Sort of Reasons Did You Have For Thinking About Wanting to Die or Killing Yourself: Completely to end or stop the pain (you couldn't go on living with the pain or how you were feeling)  Total Score: 21  Step 5: Documentation  Risk Level: Moderate suicide risk    Psychiatric Impression and Plan of Care  Assessment and Plan: Met [facility] w/ pt and RN for assessment. Pt reports active SI and is  seeking help to manage. Pt seen in ED within last two weeks for SI, was discharged at that time but contiues experiencing symptoms. Pt also reports seeking help detoxing from substances. Pt reports last use was last night with plan to present to [facility] this morning for help. Pt has a history of attempts and is uncertain that he is able to keep himself safe at this time. Pt presents cooperative but appears visibly anxious due to restlessness. Pt would benefit from inpatient admission for safety and stabilization.  Specific Resources Provided to Patient: peer support services not offered at [facility] Notified: N/A  PHP/IOP Recommended: N/A  Specific Information Provided for PHP/IOP: N/A  Plan Comments: Diagnostic impression: major depressive disorder    Outcome/Disposition  Patient's Perception of Outcome Achieved: in agreement  Assessment, Recommendations and Risk Level Reviewed with: Dr Ginny Darby  Contact Name: N/A  EPAT Assessment Completed Date: 02/07/24  EPAT Assessment Completed Time: 1503  Patient Disposition: Out of network facility (Specify)  Out of Network Reason: Patient requires dual diagnosis treatment

## 2024-02-07 NOTE — ED PROVIDER NOTES
The patient was seen in conjunction with the advanced practice provider.  I personally saw the patient and made/approved the management plan. I take responsibility for the patient management. If applicable, all laboratory studies, radiology, and EKGs were reviewed by me.     History and Exam by me shows:    28-year-old male with a history of depression and substance abuse specifically Percocet presents to the ER requesting detox from Percocet.  Last Percocets were several days ago.  Also has depression and now suicidal ideations.  No plan or attempt.    No chest pain or shortness of breath.  No cough or wheezing.  No nausea vomiting or abdominal pain.  No self injury.  No suicidal plan.    Reviewed vital signs.  Constitutional: Well appearing and hydrated. Awake & alert. No acute distress.  Head: Atraumatic  Eyes: Sclerae are anicteric and not injected.  ENT: Airway is patent.  Neck: Neck is supple and non-tender. No stridor.  Cardiovascular: Regular rate.  Pulmonary/Chest: Clear to auscultation bilaterally . No distress  GI: Soft and non-distended. There is no discomfort with palpation.   Extremities: Full range of motion in all extremities and no deformity.  No signs of self injury or laceration  Neurological: Alert, awake.  Moving all extremities  Skin: Skin is warm and dry.  Psychiatric: Cooperative.  Depressed with suicidal ideations.  No plan    EKG:  interpreted by me, Emergency Department Physician    1.  Popeye bradycardia 56, no ST elevations, QTc 403, incomplete right bundle branch block, no prior, interpreted at 1510    MDM:    Patient is medically cleared for evaluation by Psychiatry.  Does not have any acute medical conditions that would interfere with evaluation treatment by Psychiatry at this time. Interviewed and examined patient.  Reviewed labs.  No significant metabolic or electrolyte abnormality. No signs of infection or trauma.    Consulted clinical social work for placement for depression,  suicide ideations and narcotic and cocaine detox.  Patient is medically clear for evaluation    I independently interpreted the following studies: Reviewed and interpreted all labs.  COVID and flu negative.  Drug screen positive for marijuana and cocaine.  PE negative.  White count is 4.3.  Hemoglobin is 15.7.          Clinical Impression:    Depression with suicidal ideation  Multisubstance abuse    Attending Emergency Physician       Sawyer Martins MD  02/07/24 [id number]

## 2024-02-07 NOTE — ED TRIAGE NOTES
BIB EMS From [facility] for mental health evaluation, no pink slip on arrival to ED. Patient is calm and cooperative and states that he hopes to reach a sober living house. Patient has suicidal thoughts without a plan and does not feel that he will act on these thoughts, denies HI. Patient admits to using opiates (percocet 10 x 10mg tablets) per day, 2 grams of cocaine per day, xanax tablet per day, vapes tobacco, denies etoh use. Patient states that there are drugs in the home where he resides and he does not feel safe returning there. Patient suffers from depression, anxiety, PTSD and previous drug overdose but does not currently take any prescribed medication

## 2024-02-07 NOTE — ED PROVIDER NOTES
"HPI   Chief Complaint   Patient presents with    Psychiatric Evaluation     BIB EMS from [facility] as a voluntary mental evaluation, no pink slip PTA. Patient is calm and cooperative on arrival to ED. LSW at bedside on arrival. Please see triage note for further details       Patient is a 28-year-old male with past medical history of substance abuse presenting with suicidal ideations.  Patient states that he is here voluntarily, and was here recently several weeks ago but at that time was pink slipped.  He says that he has had daily thoughts of harming himself, without plan.  He denies homicidal ideations.  Today he went to [facility], looking to get help in expressing his thoughts of sadness and wishing to harm himself.  They subsequently had him brought to the emergency department for evaluation.  Patient states he is actively looking for help, and is \"tired of being sad all the time \".  Patient endorses having frequently used Percocet, with his last use yesterday.  Denies fevers, chills, chest pain, shortness of breath, abdominal pain, vomiting.  He does endorse mild nausea, which he relates to possible withdrawal.                          [place] Coma Scale Score: 15                     Patient History   No past medical history on file.  No past surgical history on file.  No family history on file.  Social History     Tobacco Use    Smoking status: Not on file    Smokeless tobacco: Not on file   Substance Use Topics    Alcohol use: Not on file    Drug use: Not on file       Physical Exam   ED Triage Vitals [02/07/24 1456]   Temperature Heart Rate Respirations BP   36 °C (96.8 °F) 69 16 (!) 140/99      Pulse Ox Temp Source Heart Rate Source Patient Position   95 % Tympanic Monitor Sitting      BP Location FiO2 (%)     Left arm --       Physical Exam  Constitutional:       Appearance: Normal appearance.      Comments: Awake, laying in examination bed, no acute distress.   HENT:      Head: " Normocephalic and atraumatic.      Nose: Nose normal.      Mouth/Throat:      Mouth: Mucous membranes are moist.      Pharynx: Oropharynx is clear.      Comments: Poor dentition throughout.  Eyes:      Extraocular Movements: Extraocular movements intact.      Pupils: Pupils are equal, round, and reactive to light.   Cardiovascular:      Rate and Rhythm: Normal rate and regular rhythm.      Pulses: Normal pulses.      Heart sounds: Normal heart sounds.   Pulmonary:      Effort: Pulmonary effort is normal.      Breath sounds: Normal breath sounds.   Abdominal:      General: Abdomen is flat.      Palpations: Abdomen is soft.   Musculoskeletal:         General: Normal range of motion.      Cervical back: Normal range of motion and neck supple.   Skin:     General: Skin is warm and dry.      Capillary Refill: Capillary refill takes less than 2 seconds.   Neurological:      General: No focal deficit present.      Mental Status: He is alert and oriented to person, place, and time.   Psychiatric:         Mood and Affect: Mood normal.         Behavior: Behavior normal.         ED Course & MDM   Diagnoses as of 02/07/24 2201   Suicidal ideations   Polysubstance abuse (CMS/[facility])       Medical Decision Making  Patient is a 28-year-old male with past medical history of substance abuse presenting with SI.  Lab work, urine, urine drug screen, ethanol, viral swabs.  Social work consult placed.    CBC shows leukopenia at 4.3 without anemia.  CMP without significant electrolyte abnormality.  Drug screen positive for cannabinoid and cocaine.  Ethanol negative.  UA with micro within normal limits.  Viral swabs within normal limits.  EKG shows sinus bradycardia.    Social work states that patient needed to be pink slipped in order to have more inpatient options.  Patient initially unhappy about this, however after further discussion his had a change of heart and would like to stay for help.  Patient is medically cleared for placement and  is awaiting placement.    Time of my departure.  Please refer to attending physician note for further evaluation, treatment and final disposition.    Portions of this note made with Dragon software, please be mindful of potential grammatical errors.        Labs Reviewed   CBC WITH AUTO DIFFERENTIAL - Abnormal       Result Value    WBC 4.3 (*)     nRBC 0.0      RBC 5.16      Hemoglobin 15.7      Hematocrit 44.8      MCV 87      MCH 30.4      MCHC 35.0      RDW 12.4      Platelets 230      Neutrophils % 62.4      Immature Granulocytes %, Automated 0.2      Lymphocytes % 27.4      Monocytes % 6.5      Eosinophils % 3.0      Basophils % 0.5      Neutrophils Absolute 2.71      Immature Granulocytes Absolute, Automated 0.01      Lymphocytes Absolute 1.19 (*)     Monocytes Absolute 0.28      Eosinophils Absolute 0.13      Basophils Absolute 0.02     DRUG SCREEN,URINE - Abnormal    Amphetamine Screen, Urine Negative      Barbiturate Screen, Urine Negative      Benzodiazepines Screen, Urine Negative      Cannabinoid Screen, Urine Positive (*)     Cocaine Metabolite Screen, Urine Positive (*)     Fentanyl Screen, Urine Negative      Methadone Screen, Urine Negative      Opiate Screen, Urine Negative      Oxycodone Screen, Urine Negative      PCP Screen, Urine Negative      Narrative:     These toxicological screening tests provide unconfirmed qualitative measurements to aid in treatment and diagnosis in cases of drug use or overdose. This test is used only for medical purposes. A positive result does not indicate or measure intoxication. For specific test performance or pathologist consultation, please contact the Laboratory.    The following threshold concentrations are used for these analyses.Values at or above the threshold concentration are reported as positive. Values below the threshold are reported as negative.    Drug /Screening Threshold                                                                                                  THC/CANNABINOIDS................50 ng/ml  METHADONE......................300 ng/ml  COCAINE METABOLITES............300 ng/ml  BENZODIAZEPINE.................300 ng/ml  PCP.............................25 ng/ml  OPIATE.........................300 ng/ml  AMPHETAMINE/ECSTASY...........1000 ng/ml  BARBITURATE....................200 ng/ml  OXYCODONE......................100 ng/ml  FENTANYL.........................5 ng/ml       URINALYSIS WITH REFLEX MICROSCOPIC - Abnormal    Color, Urine Yellow      Appearance, Urine Turbid (*)     Specific Gravity, Urine 1.036 (*)     pH, Urine 6.5      Protein, Urine 30 (1+) (*)     Glucose, Urine Normal      Blood, Urine NEGATIVE      Ketones, Urine NEGATIVE      Bilirubin, Urine NEGATIVE      Urobilinogen, Urine 3 (1+) (*)     Nitrite, Urine NEGATIVE      Leukocyte Esterase, Urine NEGATIVE     COMPREHENSIVE METABOLIC PANEL - Normal    Glucose 86      Sodium 141      Potassium 3.9      Chloride 106      Bicarbonate 24      Urea Nitrogen 14      Creatinine 1.30      eGFR 77      Calcium 9.4      Albumin 4.6      Alkaline Phosphatase 59      Total Protein 7.5      AST 18      Bilirubin, Total 0.7      ALT 20      Anion Gap 11     ALCOHOL - Normal    Alcohol <0.010     SARS-COV-2 AND INFLUENZA A/B PCR - Normal    Flu A Result Not Detected      Flu B Result Not Detected      Coronavirus 2019, PCR Not Detected      Narrative:     This assay has received FDA Emergency Use Authorization (EUA) and  is only authorized for the duration of time that circumstances exist to justify the authorization of the emergency use of in vitro diagnostic tests for the detection of SARS-CoV-2 virus and/or diagnosis of COVID-19 infection under section 564(b)(1) of the Act, 21 U.S.C. 360bbb-3(b)(1). Testing for SARS-CoV-2 is only recommended for patients who meet current clinical and/or epidemiological criteria as defined by federal, state, or local public health directives. This assay is an in  vitro diagnostic nucleic acid amplification test for the qualitative detection of SARS-CoV-2, Influenza A, and Influenza B from nasopharyngeal specimens and has been validated for use at [facility]. Negative results do not preclude COVID-19 infections or Influenza A/B infections, and should not be used as the sole basis for diagnosis, treatment, or other management decisions. If Influenza A/B and RSV PCR results are negative, testing for Parainfluenza virus, Adenovirus and Metapneumovirus is routinely performed for [facility] pediatric oncology and intensive care inpatients, and is available on other patients by placing an add-on request.    MICROSCOPIC ONLY, URINE    WBC, Urine 1-5      RBC, Urine NONE      Mucus, Urine 4+         Procedure  Procedures     Duke Rose PA-C  02/07/24 [id number]

## 2024-02-08 LAB — CK SERPL-CCNC: 147 U/L (ref 24–195)

## 2024-02-08 PROCEDURE — 82550 ASSAY OF CK (CPK): CPT | Performed by: STUDENT IN AN ORGANIZED HEALTH CARE EDUCATION/TRAINING PROGRAM

## 2024-02-08 PROCEDURE — 99222 1ST HOSP IP/OBS MODERATE 55: CPT

## 2024-02-08 PROCEDURE — 2500000001 HC RX 250 WO HCPCS SELF ADMINISTERED DRUGS (ALT 637 FOR MEDICARE OP): Performed by: STUDENT IN AN ORGANIZED HEALTH CARE EDUCATION/TRAINING PROGRAM

## 2024-02-08 PROCEDURE — 36415 COLL VENOUS BLD VENIPUNCTURE: CPT | Performed by: STUDENT IN AN ORGANIZED HEALTH CARE EDUCATION/TRAINING PROGRAM

## 2024-02-08 RX ORDER — MIDAZOLAM HYDROCHLORIDE 5 MG/ML
5 INJECTION, SOLUTION INTRAMUSCULAR; INTRAVENOUS ONCE
Status: DISCONTINUED | OUTPATIENT
Start: 2024-02-08 | End: 2024-02-08

## 2024-02-08 RX ORDER — HYDROXYZINE PAMOATE 50 MG/1
50 CAPSULE ORAL EVERY 6 HOURS PRN
Status: DISCONTINUED | OUTPATIENT
Start: 2024-02-08 | End: 2024-02-09 | Stop reason: HOSPADM

## 2024-02-08 RX ORDER — HALOPERIDOL 5 MG/ML
5 INJECTION INTRAMUSCULAR ONCE
Status: DISCONTINUED | OUTPATIENT
Start: 2024-02-08 | End: 2024-02-08

## 2024-02-08 RX ORDER — HALOPERIDOL 5 MG/1
5 TABLET ORAL ONCE
Status: COMPLETED | OUTPATIENT
Start: 2024-02-08 | End: 2024-02-08

## 2024-02-08 RX ADMIN — HALOPERIDOL 5 MG: 5 TABLET ORAL at 02:16

## 2024-02-08 SDOH — HEALTH STABILITY: MENTAL HEALTH: BEHAVIORS/MOOD: CALM;COOPERATIVE;SLEEPING

## 2024-02-08 SDOH — HEALTH STABILITY: MENTAL HEALTH: BEHAVIORS/MOOD: SLEEPING;CALM;COOPERATIVE

## 2024-02-08 SDOH — HEALTH STABILITY: MENTAL HEALTH: BEHAVIORS/MOOD: COOPERATIVE;CALM;SLEEPING

## 2024-02-08 NOTE — PROGRESS NOTES
Patient received in sign out from Dr Gross. Patient pending placement at this time.    Patient signed out to Dr. Ho pending placement.    Behavioral Restraint / Seclusion Face to Face Assessment    Patient Name:         Renan Zhu  YOB: 1995  Medical Record #:   [id number]      Time Restraints were placed:      Date Assessment was completed: 2/8/2024    Time patient was assessed: 1:35 AM     Description of behavior causing restraint/seclusion:  disruptive, yelling, agitated    Type of intervention: Chemical    Patient's immediate situation: aggressive    Alternatives Attempted: Alternatives attempted and have been ineffective.    Contraindications for Restraints: Reviewed contraindications for continued restraint use and agree to on-going need.    Patent's reaction to intervention: continues to be agitated    Patient's medical condition: positioned safely based upon medical and psychological issues    Patient's behavioral condition: continues to display agitated, threatening, or violent behavior to others    Plan: Continue restraints    Behavioral Restraint / Seclusion Face to Face Assessment    Patient Name:         Renan Zhu  YOB: 1995  Medical Record #:   [id number]      Time Restraints were placed:      Date Assessment was completed: 2/8/2024    Time patient was assessed: 1:51 AM     Description of behavior causing restraint/seclusion:  agitation    Type of intervention: Chemical    Patient's immediate situation: no signs of physical distress    Alternatives Attempted: Alternatives attempted and have been ineffective.    Contraindications for Restraints: Reviewed contraindications for continued restraint use and agree to on-going need.    Patent's reaction to intervention: appears safe and appears comfortable    Patient's medical condition: positioned safely based upon medical and psychological issues    Patient's behavioral condition: now  cooperative    Plan: Discontinue restraints now

## 2024-02-08 NOTE — PROGRESS NOTES
Social Work Note    Pt declined by Dr. Jaime MD at [facility] for not being medically appropriate for detox.   Called 2x to [facility] to follow-up on referral - No answer.   [facility] - no available dual dx beds.    TCT [facility] - No available Dual Dx beds.   TCT JUAN CARLOS Lama at [facility] about bed availability. Waiting for response.
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
## 2024-02-08 NOTE — PROGRESS NOTES
Social Work Note    JULEE Alonzo at [facility]. Pt declined as symptoms are said to reflect substance induced psychosis.   CK levels faxed to [facility]. Referral to be reviewed by doctor in the morning.

## 2024-02-08 NOTE — PROGRESS NOTES
Social Work Note    1330 FTF w/ Germania Chu, psych NP who met with pt regarding medication. Per Germania, pt is refusing to take any medication at this time.     1530 TCT SWG to inquire about bed availability. Per intake, they do not take pts for dual treatment, however, they will review clinicals to see if pt is appropriate for their unit. Clinicals will be sent upon completion of documentation from NP.

## 2024-02-09 ENCOUNTER — HOSPITAL ENCOUNTER (INPATIENT)
Facility: HOSPITAL | Age: 29
LOS: 3 days | Discharge: HOME | End: 2024-02-12
Attending: PSYCHIATRY & NEUROLOGY | Admitting: PSYCHIATRY & NEUROLOGY
Payer: MEDICAID

## 2024-02-09 ENCOUNTER — APPOINTMENT (OUTPATIENT)
Dept: CARDIOLOGY | Facility: HOSPITAL | Age: 29
End: 2024-02-09
Payer: MEDICAID

## 2024-02-09 VITALS
BODY MASS INDEX: 19.45 KG/M2 | HEIGHT: 66 IN | HEART RATE: 62 BPM | TEMPERATURE: 97.7 F | WEIGHT: 121.03 LBS | RESPIRATION RATE: 17 BRPM | OXYGEN SATURATION: 98 % | SYSTOLIC BLOOD PRESSURE: 118 MMHG | DIASTOLIC BLOOD PRESSURE: 71 MMHG

## 2024-02-09 DIAGNOSIS — F31.32 BIPOLAR AFFECTIVE DISORDER, CURRENTLY DEPRESSED, MODERATE (MULTI): Primary | ICD-10-CM

## 2024-02-09 PROBLEM — F33.1 MDD (MAJOR DEPRESSIVE DISORDER), RECURRENT EPISODE, MODERATE (MULTI): Status: ACTIVE | Noted: 2024-02-09

## 2024-02-09 LAB
BILIRUB DIRECT SERPL-MCNC: <0.2 MG/DL (ref 0–0.2)
CK SERPL-CCNC: 143 U/L (ref 24–195)

## 2024-02-09 PROCEDURE — 2500000001 HC RX 250 WO HCPCS SELF ADMINISTERED DRUGS (ALT 637 FOR MEDICARE OP): Performed by: REGISTERED NURSE

## 2024-02-09 PROCEDURE — 36415 COLL VENOUS BLD VENIPUNCTURE: CPT | Performed by: STUDENT IN AN ORGANIZED HEALTH CARE EDUCATION/TRAINING PROGRAM

## 2024-02-09 PROCEDURE — 82550 ASSAY OF CK (CPK): CPT | Performed by: STUDENT IN AN ORGANIZED HEALTH CARE EDUCATION/TRAINING PROGRAM

## 2024-02-09 PROCEDURE — 93005 ELECTROCARDIOGRAM TRACING: CPT

## 2024-02-09 PROCEDURE — 93010 ELECTROCARDIOGRAM REPORT: CPT | Performed by: INTERNAL MEDICINE

## 2024-02-09 PROCEDURE — 99222 1ST HOSP IP/OBS MODERATE 55: CPT | Performed by: PSYCHIATRY & NEUROLOGY

## 2024-02-09 PROCEDURE — 2500000001 HC RX 250 WO HCPCS SELF ADMINISTERED DRUGS (ALT 637 FOR MEDICARE OP): Performed by: STUDENT IN AN ORGANIZED HEALTH CARE EDUCATION/TRAINING PROGRAM

## 2024-02-09 PROCEDURE — 1240000001 HC SEMI-PRIVATE BH ROOM DAILY

## 2024-02-09 PROCEDURE — 82248 BILIRUBIN DIRECT: CPT | Performed by: REGISTERED NURSE

## 2024-02-09 PROCEDURE — 2500000002 HC RX 250 W HCPCS SELF ADMINISTERED DRUGS (ALT 637 FOR MEDICARE OP, ALT 636 FOR OP/ED): Performed by: REGISTERED NURSE

## 2024-02-09 PROCEDURE — 99221 1ST HOSP IP/OBS SF/LOW 40: CPT | Performed by: REGISTERED NURSE

## 2024-02-09 RX ORDER — OLANZAPINE 5 MG/1
5 TABLET ORAL NIGHTLY
Status: DISCONTINUED | OUTPATIENT
Start: 2024-02-09 | End: 2024-02-12 | Stop reason: HOSPADM

## 2024-02-09 RX ORDER — OLANZAPINE 10 MG/2ML
5 INJECTION, POWDER, FOR SOLUTION INTRAMUSCULAR EVERY 6 HOURS PRN
Status: DISCONTINUED | OUTPATIENT
Start: 2024-02-09 | End: 2024-02-12 | Stop reason: HOSPADM

## 2024-02-09 RX ORDER — ALUMINUM HYDROXIDE, MAGNESIUM HYDROXIDE, AND SIMETHICONE 1200; 120; 1200 MG/30ML; MG/30ML; MG/30ML
10 SUSPENSION ORAL 4 TIMES DAILY PRN
Status: DISCONTINUED | OUTPATIENT
Start: 2024-02-09 | End: 2024-02-12 | Stop reason: HOSPADM

## 2024-02-09 RX ORDER — HYDROXYZINE HYDROCHLORIDE 25 MG/1
50 TABLET, FILM COATED ORAL EVERY 6 HOURS PRN
Status: DISCONTINUED | OUTPATIENT
Start: 2024-02-09 | End: 2024-02-12 | Stop reason: HOSPADM

## 2024-02-09 RX ORDER — IBUPROFEN 600 MG/1
600 TABLET ORAL EVERY 6 HOURS PRN
Status: DISCONTINUED | OUTPATIENT
Start: 2024-02-09 | End: 2024-02-12 | Stop reason: HOSPADM

## 2024-02-09 RX ORDER — LORAZEPAM 0.5 MG/1
0.5 TABLET ORAL ONCE
Status: COMPLETED | OUTPATIENT
Start: 2024-02-09 | End: 2024-02-09

## 2024-02-09 RX ORDER — OLANZAPINE 5 MG/1
5 TABLET ORAL EVERY 6 HOURS PRN
Status: DISCONTINUED | OUTPATIENT
Start: 2024-02-09 | End: 2024-02-12 | Stop reason: HOSPADM

## 2024-02-09 RX ORDER — TRAZODONE HYDROCHLORIDE 50 MG/1
50 TABLET ORAL NIGHTLY PRN
Status: DISCONTINUED | OUTPATIENT
Start: 2024-02-09 | End: 2024-02-12 | Stop reason: HOSPADM

## 2024-02-09 RX ORDER — ACETAMINOPHEN 325 MG/1
650 TABLET ORAL EVERY 6 HOURS PRN
Status: DISCONTINUED | OUTPATIENT
Start: 2024-02-09 | End: 2024-02-12 | Stop reason: HOSPADM

## 2024-02-09 RX ORDER — MICONAZOLE NITRATE 2 %
4 CREAM (GRAM) TOPICAL EVERY 2 HOUR PRN
Status: DISCONTINUED | OUTPATIENT
Start: 2024-02-09 | End: 2024-02-12 | Stop reason: HOSPADM

## 2024-02-09 RX ADMIN — NICOTINE POLACRILEX 4 MG: 2 GUM, CHEWING BUCCAL at 14:51

## 2024-02-09 RX ADMIN — LORAZEPAM 0.5 MG: 0.5 TABLET ORAL at 00:41

## 2024-02-09 RX ADMIN — TRAZODONE HYDROCHLORIDE 50 MG: 50 TABLET ORAL at 21:06

## 2024-02-09 RX ADMIN — OLANZAPINE 5 MG: 5 TABLET, FILM COATED ORAL at 21:06

## 2024-02-09 SDOH — HEALTH STABILITY: MENTAL HEALTH

## 2024-02-09 SDOH — SOCIAL STABILITY: SOCIAL INSECURITY: HAVE YOU HAD THOUGHTS OF HARMING ANYONE ELSE?: NO

## 2024-02-09 SDOH — SOCIAL STABILITY: SOCIAL INSECURITY: DO YOU FEEL UNSAFE GOING BACK TO THE PLACE WHERE YOU ARE LIVING?: NO

## 2024-02-09 SDOH — ECONOMIC STABILITY: HOUSING INSECURITY: FEELS SAFE LIVING IN HOME: YES

## 2024-02-09 SDOH — ECONOMIC STABILITY: HOUSING INSECURITY
IN THE LAST 12 MONTHS, WAS THERE A TIME WHEN YOU DID NOT HAVE A STEADY PLACE TO SLEEP OR SLEPT IN A SHELTER (INCLUDING NOW)?: NO

## 2024-02-09 SDOH — SOCIAL STABILITY: SOCIAL INSECURITY: ARE YOU OR HAVE YOU BEEN THREATENED OR ABUSED PHYSICALLY, EMOTIONALLY, OR SEXUALLY BY ANYONE?: YES

## 2024-02-09 SDOH — ECONOMIC STABILITY: TRANSPORTATION INSECURITY
IN THE PAST 12 MONTHS, HAS LACK OF TRANSPORTATION KEPT YOU FROM MEETINGS, WORK, OR FROM GETTING THINGS NEEDED FOR DAILY LIVING?: NO

## 2024-02-09 SDOH — HEALTH STABILITY: MENTAL HEALTH: HOW OFTEN DO YOU HAVE 6 OR MORE DRINKS ON ONE OCCASION?: LESS THAN MONTHLY

## 2024-02-09 SDOH — ECONOMIC STABILITY: INCOME INSECURITY: HOW HARD IS IT FOR YOU TO PAY FOR THE VERY BASICS LIKE FOOD, HOUSING, MEDICAL CARE, AND HEATING?: NOT VERY HARD

## 2024-02-09 SDOH — SOCIAL STABILITY: SOCIAL INSECURITY: DOES ANYONE TRY TO KEEP YOU FROM HAVING/CONTACTING OTHER FRIENDS OR DOING THINGS OUTSIDE YOUR HOME?: NO

## 2024-02-09 SDOH — SOCIAL STABILITY: SOCIAL INSECURITY: WERE YOU ABLE TO COMPLETE ALL THE BEHAVIORAL HEALTH SCREENINGS?: YES

## 2024-02-09 SDOH — SOCIAL STABILITY: SOCIAL INSECURITY: HAS ANYONE EVER THREATENED TO HURT YOUR FAMILY OR YOUR PETS?: YES

## 2024-02-09 SDOH — SOCIAL STABILITY: SOCIAL INSECURITY: DO YOU FEEL ANYONE HAS EXPLOITED OR TAKEN ADVANTAGE OF YOU FINANCIALLY OR OF YOUR PERSONAL PROPERTY?: NO

## 2024-02-09 SDOH — SOCIAL STABILITY: SOCIAL INSECURITY: ABUSE: ADULT

## 2024-02-09 SDOH — ECONOMIC STABILITY: INCOME INSECURITY: IN THE LAST 12 MONTHS, WAS THERE A TIME WHEN YOU WERE NOT ABLE TO PAY THE MORTGAGE OR RENT ON TIME?: NO

## 2024-02-09 SDOH — ECONOMIC STABILITY: TRANSPORTATION INSECURITY
IN THE PAST 12 MONTHS, HAS THE LACK OF TRANSPORTATION KEPT YOU FROM MEDICAL APPOINTMENTS OR FROM GETTING MEDICATIONS?: NO

## 2024-02-09 SDOH — HEALTH STABILITY: MENTAL HEALTH: HOW MANY STANDARD DRINKS CONTAINING ALCOHOL DO YOU HAVE ON A TYPICAL DAY?: 3 OR 4

## 2024-02-09 SDOH — HEALTH STABILITY: MENTAL HEALTH: ANXIETY SYMPTOMS: GENERALIZED

## 2024-02-09 SDOH — HEALTH STABILITY: MENTAL HEALTH: EXPERIENCED ANY OF THE FOLLOWING LIFE EVENTS: PHYSICAL ASSAULT;WITNESS TO INJURY TO OR DEATH OF SOMEONE ELSE

## 2024-02-09 SDOH — HEALTH STABILITY: MENTAL HEALTH: HOW OFTEN DO YOU HAVE A DRINK CONTAINING ALCOHOL?: 2-4 TIMES A MONTH

## 2024-02-09 SDOH — ECONOMIC STABILITY: HOUSING INSECURITY: IN THE LAST 12 MONTHS, HOW MANY PLACES HAVE YOU LIVED?: 1

## 2024-02-09 SDOH — SOCIAL STABILITY: SOCIAL INSECURITY: ARE THERE ANY APPARENT SIGNS OF INJURIES/BEHAVIORS THAT COULD BE RELATED TO ABUSE/NEGLECT?: NO

## 2024-02-09 ASSESSMENT — ACTIVITIES OF DAILY LIVING (ADL)
JUDGMENT_ADEQUATE_SAFELY_COMPLETE_DAILY_ACTIVITIES: YES
ADEQUATE_TO_COMPLETE_ADL: YES
HEARING - RIGHT EAR: FUNCTIONAL
TOILETING: INDEPENDENT
GROOMING: INDEPENDENT
HEARING - LEFT EAR: FUNCTIONAL
BATHING: INDEPENDENT
PATIENT'S MEMORY ADEQUATE TO SAFELY COMPLETE DAILY ACTIVITIES?: YES
DRESSING YOURSELF: INDEPENDENT
WALKS IN HOME: INDEPENDENT
FEEDING YOURSELF: INDEPENDENT

## 2024-02-09 ASSESSMENT — PAIN SCALES - GENERAL
PAINLEVEL_OUTOF10: 0 - NO PAIN
PAINLEVEL_OUTOF10: 0 - NO PAIN

## 2024-02-09 ASSESSMENT — PAIN - FUNCTIONAL ASSESSMENT
PAIN_FUNCTIONAL_ASSESSMENT: 0-10
PAIN_FUNCTIONAL_ASSESSMENT: 0-10

## 2024-02-09 ASSESSMENT — LIFESTYLE VARIABLES
PRESCIPTION_ABUSE_PAST_12_MONTHS: YES
SUBSTANCE_ABUSE_PAST_12_MONTHS: YES
SUBSTANCE_ABUSE_PAST_12_MONTHS: YES
AUDIT-C TOTAL SCORE: 4
SKIP TO QUESTIONS 9-10: 0

## 2024-02-09 ASSESSMENT — COLUMBIA-SUICIDE SEVERITY RATING SCALE - C-SSRS
2. HAVE YOU ACTUALLY HAD ANY THOUGHTS OF KILLING YOURSELF?: NO
6. HAVE YOU EVER DONE ANYTHING, STARTED TO DO ANYTHING, OR PREPARED TO DO ANYTHING TO END YOUR LIFE?: NO
1. SINCE LAST CONTACT, HAVE YOU WISHED YOU WERE DEAD OR WISHED YOU COULD GO TO SLEEP AND NOT WAKE UP?: NO

## 2024-02-09 ASSESSMENT — PATIENT HEALTH QUESTIONNAIRE - PHQ9
SUM OF ALL RESPONSES TO PHQ9 QUESTIONS 1 & 2: 2
2. FEELING DOWN, DEPRESSED OR HOPELESS: SEVERAL DAYS
1. LITTLE INTEREST OR PLEASURE IN DOING THINGS: SEVERAL DAYS

## 2024-02-09 NOTE — PROGRESS NOTES
Emergency Medicine Transition of Care Note.    I received Renan Zhu in signout from Dr. Figueroa.  Please see the previous ED provider note for all HPI, PE and MDM up to the time of signout at 0 600. This is in addition to the primary record.    In brief Renan Zhu is an 28 y.o. male presenting for suicidal ideation  Chief Complaint   Patient presents with    Psychiatric Evaluation     BIB EMS from [facility] as a voluntary mental evaluation, no pink slip PTA. Patient is calm and cooperative on arrival to ED. LSW at bedside on arrival. Please see triage note for further details     At the time of signout we were awaiting: Placement to outside facility    ED Course as of 02/09/24 1046   Thu Feb 08, 2024   0603 S/o SI, req opiate detox [DH]      ED Course User Index  [DH] Rick Ho MD         Diagnoses as of 02/09/24 1046   Suicidal ideations   Polysubstance abuse (CMS/HCC)       Medical Decision Making  Spoke to social work regarding outpatient.  Patient is excepted to [facility] by Dr. Huynh and stable at this time for transport.    Final diagnoses:   [R45.851] Suicidal ideations   [F19.10] Polysubstance abuse (CMS/HCC)           Procedure  Procedures    Rick Ho MD

## 2024-02-09 NOTE — PROGRESS NOTES
Patient was initially seen by my colleague and endorsed to me on signout.    Briefly, patient is a 28-year-old male that presented to the emergency department for evaluation.  Patient was seen, evaluated and medically cleared by my colleague and found to be in acute psychosis.  He also has polysubstance abuse and having suicidal thoughts.  He is on involuntary hold at this time.  Attempting placement at [facility] for further psychiatric treatment at this time.  Case signed out to oncoming physician pending further attempt to place patient for psychiatric treatment.

## 2024-02-09 NOTE — CARE PLAN
The patient's goals for the shift include  Complete admission     The clinical goals for the shift include  Complete admission

## 2024-02-09 NOTE — CONSULTS
Consults    Reason For Consult  Adult medical examination and optimization for behavioral health unit      History Of Present Illness  Renan Zhu is a 28 y.o. male to ED with suicidal ideations and acute psychosis.  Patient admits to polysubstance abuse.  CBC was unremarkable, CMP unremarkable, flu A, B, COVID-negative, urinalysis negative for infection, urine toxicology positive for cannabis and cocaine.  Patient was medically cleared and transferred to  behavioral health unit.  Hospitalist team was consulted for adult medical examination optimization for behavioral health unit.    Patient examined and seen. Alert and oriented x3, explained to patient assessment, right to , and the right to decline assessment. Patient verbalized understanding and agreed to assessment with RN as . Patient denies chest pain, shortness of breath, palpitations, abdominal pain, fever or chills.  Patient denies any suicidal ideations at this time.  Denies AV hallucinations.  Voices no medical concerns at this time.    Hospitalist to evaluate medical optimization for psychiatric treatment and evaluation.  Neurological evaluation completed.  No acute or chronic medical issues identified at this time that could be contributing to underlying psychiatric symptoms. Pt is medically optimized for inpatient behavioral health evaluation and treatment.     Review of systems: 10 system were reviewed and were negative except what was mentioned in history of present illness       Past Medical History  ESTRELLITA, MDD, polysubstance abuse, PTSD    Surgical History  He has no past surgical history on file.   Had ear sx as a small child unsure of details     Social History  Xanax, Percocet and Cocaine Daily   Vapes daily  Occasional alcohol use     Family History  None per patient      Allergies  Patient has no known allergies.         Physical Exam  Constitutional: Well developed, awake/alert/oriented x3, no distress,  cooperative  Eyes: PERRL, EOMI, clear sclera  ENMT: mucous membranes moist, no apparent injury, no lesions seen, poor dentition   Head/Neck: Neck supple, no apparent injury, thyroid without mass or tenderness  Respiratory/Thorax: Patent airways,  normal breath sounds   Cardiovascular: Regular, rate and rhythm, no murmurs,  normal S 1and S 2  Gastrointestinal: Nondistended, soft, non-tender,    Genitourinary: denies CVA tenderness  or suprapubic tenderness,  voiding freely   Musculoskeletal: ROM intact, no joint swelling,   Extremities: normal extremities,  no contusions or wounds seen   Skin: warm, dry, intact  Neurological: alert/oriented x 3, speech clear, cranial nerves II through XII  grossly intact  Psychiatric: appropriate mood and behavior  Cranial Nerve Exam: II, III, IV, VI: Visual acuity within normal limits bilaterally, visual fields normal in all quadrants, MONIK, EOMI  Cranial Nerve exam: V: Facial sensations intact bilaterally to dull, sharp, and light touch stimuli  Cranial Nerve exam VII: Facial muscle strength normal/equal bilaterally  Cranial Nerve Exam VIII: Hearing is normal bilaterally  Cranial Nerve IX,X: Palate and Uvula symmetrical, voice is normal  Cranial Nerve XI: Shoulder shrug strong, equal bilaterally  Cranial Nerve XII: Tongue moves symmetrically  Motor : Good muscle tone, Strength equal upper and lower extremities unless otherwise stated above  Cerebellar: normal gait unless otherwise stated above         Last Recorded Vitals  /81 (BP Location: Right arm, Patient Position: Sitting)   Pulse 60   Temp 36.4 °C (97.5 °F) (Temporal)   Resp 16   Wt 56.7 kg (125 lb)   SpO2 99%     Relevant Results  Scheduled medications  OLANZapine, 5 mg, oral, Nightly      Continuous medications     PRN medications  PRN medications: acetaminophen, alum-mag hydroxide-simeth, hydrOXYzine HCL, ibuprofen, nicotine polacrilex, OLANZapine, OLANZapine, traZODone    Results for orders placed or performed  during the hospital encounter of 02/07/24 (from the past 24 hour(s))   Creatine Kinase   Result Value Ref Range    Creatine Kinase 143 24 - 195 U/L          Assessment/Plan     # Suicidal Ideations   Acute Psychosis   Admit to Behavioral Health Unit  Contract for Safety   Safety Protocols  Medications to be determined by psychiatry   Vital Signs BID  Group Therapy as appropriate  Social Work for outpatient therapy   Encourage Healthy Lifestyle and Exercise as appropriate     #Tobacco Dependency / Substance Abuse - Opiate, Cocaine, Benzo  Nicotine cessation   Risks discussed  Encourage abstinence  Inpatient/Outpatient treatment therapies     ECG pending  Not located in [facility]    Thank you for consult  Medicine to sign off  Call for acute needs      Time spent  46  minutes obtaining labs, imaging, recommendations, interview, assessment, examination, medication review/ordering, and EMR review.    Plan of care was discussed extensively with patient, RN and psych NP. Patient verbalized understanding through teach back method. All questions and concerns addressed upon examination.     Of note, this documentation is completed using the Dragon Dictation system (voice recognition software). There may be spelling and/or grammatical errors that were not corrected prior to final submission.      Mikayla Cates, APRN-CNP

## 2024-02-09 NOTE — PROGRESS NOTES
Social Work Note    Pt accepted to [facility] by Dr. Huynh and nurse to nurse can be called to [phone]

## 2024-02-09 NOTE — H&P
History Of Present Illness  Renan Zhu is a 28 y.o. year old male patient with past psych history of MDD, ESTRELLITA, PTSD, and substance use, patient presented to the ED from [facility] for psychiatric evaluation after he expressed suicidal ideation.  Patient reported that he had been using Percocet, cocaine, and Xanax daily.  Patient reported that his substance use was significantly worsening his mental health.  Patient later stated that he only uses cocaine and marijuana daily, reports he only uses Percocet occasionally.  Patient also reported that he was recently kicked out of his friend's home.  Patient expresses desire to get off drugs as he understands how it is negatively affecting his mental health.  Patient reports that he has been struggling with worsening mental illness since his dad  in .  Patient does not endorse symptoms of xander including impulsivity, irritability, mood swings in the absence of substance use.  Patient calm and cooperative on 5 W., was irritable and impulsive in the ED, punching the walls.  Patient denies any history of psychosis.  Patient reports he has been using cocaine since he was in 6th grade.  States that he is interested in outpatient substance use treatment.    Past Medical History  No past medical history on file.    Past Psychiatric History:   Previous therapy: no  Previous psychiatric treatment and medication trials: no  Previous psychiatric hospitalizations: yes - reports multiple past psychiatric admissions  Previous diagnoses: yes - cocaine use disorder, PTSD, MDD  Previous suicide attempts: no  History of violence: no    Allergies  No Known Allergies     MSE  General: Appropriately groomed and dressed.  Appearance: Appears stated age.  Attitude: Calm, cooperative.  Behavior: Appropriate eye contact.  Motor activity: No agitation or retardation. no EPS.  Normal gait.  Speech: Regular rate, rhythm, volume and tone.  Mood: Depressed  Affect: Flat  Thought  process: Organized, linear, goal-directed.  Associations are logical.  Thought content: Does not endorse suicidal or homicidal ideation, no delusions elicited.  Thought perception: Did not endorse auditory or visual hallucinations.  Cognition: Alert, oriented x3.  no deficit in memory or attention.  Insight: Poor  Judgment: Impaired    Psychiatric Risk Assessment  Violence Risk Assessment: major mental illness, male, and substance abuse  Acute Risk of Harm to Others is Considered: low   Suicide Risk Assessment: , current psychiatric illness, and male  Protective Factors against Suicide: hopefulness/future orientation, sense of responsibility toward family, and social support/connectedness  Acute Risk of Harm to Self is Considered: moderate    Last Recorded Vitals  Vitals:    02/09/24 1331   BP: 127/81   Pulse: 60   Resp: 16   Temp: 36.4 °C (97.5 °F)   SpO2: 99%        Relevant Results  Scheduled medications  OLANZapine, 5 mg, oral, Nightly      Continuous medications     PRN medications  PRN medications: acetaminophen, alum-mag hydroxide-simeth, hydrOXYzine HCL, ibuprofen, nicotine polacrilex, OLANZapine, OLANZapine, traZODone     Results for orders placed or performed during the hospital encounter of 02/07/24 (from the past 96 hour(s))   CBC and Auto Differential   Result Value Ref Range    WBC 4.3 (L) 4.4 - 11.3 x10*3/uL    nRBC 0.0 0.0 - 0.0 /100 WBCs    RBC 5.16 4.50 - 5.90 x10*6/uL    Hemoglobin 15.7 13.5 - 17.5 g/dL    Hematocrit 44.8 41.0 - 52.0 %    MCV 87 80 - 100 fL    MCH 30.4 26.0 - 34.0 pg    MCHC 35.0 32.0 - 36.0 g/dL    RDW 12.4 11.5 - 14.5 %    Platelets 230 150 - 450 x10*3/uL    Neutrophils % 62.4 40.0 - 80.0 %    Immature Granulocytes %, Automated 0.2 0.0 - 0.9 %    Lymphocytes % 27.4 13.0 - 44.0 %    Monocytes % 6.5 2.0 - 10.0 %    Eosinophils % 3.0 0.0 - 6.0 %    Basophils % 0.5 0.0 - 2.0 %    Neutrophils Absolute 2.71 1.20 - 7.70 x10*3/uL    Immature Granulocytes Absolute, Automated  0.01 0.00 - 0.70 x10*3/uL    Lymphocytes Absolute 1.19 (L) 1.20 - 4.80 x10*3/uL    Monocytes Absolute 0.28 0.10 - 1.00 x10*3/uL    Eosinophils Absolute 0.13 0.00 - 0.70 x10*3/uL    Basophils Absolute 0.02 0.00 - 0.10 x10*3/uL   Comprehensive metabolic panel   Result Value Ref Range    Glucose 86 65 - 99 mg/dL    Sodium 141 133 - 145 mmol/L    Potassium 3.9 3.4 - 5.1 mmol/L    Chloride 106 97 - 107 mmol/L    Bicarbonate 24 24 - 31 mmol/L    Urea Nitrogen 14 8 - 25 mg/dL    Creatinine 1.30 0.40 - 1.60 mg/dL    eGFR 77 >60 mL/min/1.73m*2    Calcium 9.4 8.5 - 10.4 mg/dL    Albumin 4.6 3.5 - 5.0 g/dL    Alkaline Phosphatase 59 35 - 125 U/L    Total Protein 7.5 5.9 - 7.9 g/dL    AST 18 5 - 40 U/L    Bilirubin, Total 0.7 0.1 - 1.2 mg/dL    ALT 20 5 - 40 U/L    Anion Gap 11 <=19 mmol/L   Ethanol   Result Value Ref Range    Alcohol <0.010 0.000 - 0.010 g/dL   DRUG SCREEN,URINE   Result Value Ref Range    Amphetamine Screen, Urine Negative      Barbiturate Screen, Urine Negative      Benzodiazepines Screen, Urine Negative      Cannabinoid Screen, Urine Positive (A)      Cocaine Metabolite Screen, Urine Positive (A)      Fentanyl Screen, Urine Negative       Methadone Screen, Urine Negative      Opiate Screen, Urine Negative      Oxycodone Screen, Urine Negative      PCP Screen, Urine Negative     Urinalysis with Reflex Microscopic   Result Value Ref Range    Color, Urine Yellow Light-Yellow, Yellow, Dark-Yellow    Appearance, Urine Turbid (N) Clear    Specific Gravity, Urine 1.036 (N) 1.005 - 1.035    pH, Urine 6.5 5.0, 5.5, 6.0, 6.5, 7.0, 7.5, 8.0    Protein, Urine 30 (1+) (A) NEGATIVE, 10 (TRACE), 20 (TRACE) mg/dL    Glucose, Urine Normal Normal mg/dL    Blood, Urine NEGATIVE NEGATIVE    Ketones, Urine NEGATIVE NEGATIVE mg/dL    Bilirubin, Urine NEGATIVE NEGATIVE    Urobilinogen, Urine 3 (1+) (A) Normal mg/dL    Nitrite, Urine NEGATIVE NEGATIVE    Leukocyte Esterase, Urine NEGATIVE NEGATIVE   Sars-CoV-2 and Influenza A/B  PCR   Result Value Ref Range    Flu A Result Not Detected Not Detected    Flu B Result Not Detected Not Detected    Coronavirus 2019, PCR Not Detected Not Detected   Microscopic Only, Urine   Result Value Ref Range    WBC, Urine 1-5 1-5, NONE /HPF    RBC, Urine NONE NONE, 1-2, 3-5 /HPF    Mucus, Urine 4+ Reference range not established. /LPF   Creatine Kinase   Result Value Ref Range    Creatine Kinase 147 24 - 195 U/L   Creatine Kinase   Result Value Ref Range    Creatine Kinase 143 24 - 195 U/L         Assessment/Plan   Principal Problem:  Bipolar depression, cocaine use disorder    Patient presented to ED with suicidal ideation.  Patient reports he has been struggling with daily cocaine use.  Patient reports episodes of xander followed by severe episodes of depression.  Patient reports multiple past inpatient psychiatric admissions, cannot recall past psychiatric medications.  Will start Zyprexa 5 mg oral daily for mood stabilization.    Impression:     Labs and Chart: reviewed   Case discussed with treatment team members  Encouraged patient to attend group and other mileu activity  Collateral from family - pending  Discharge planing  Medication:       -Zyprexa 5 mg oral daily at bedtime    Medication Consent  Medication Consent: risks, benefits, side effects reviewed for all ordered meds and patient expressed understanding and consent obtained    SUSIE Franklin-CNP

## 2024-02-09 NOTE — CARE PLAN
"Pt. cooperative since arriving to the unit today, is restless and anxious and discharge focused, but overall cooperative, cooperative with completing admission process.  Pt. more pleasant and calm later in the day after acclimating to the unit.  Pt. Showered, ate well, is active and social on the unit.  Pt. Denies having any suicidal ideations, states that he lied to get admitted so that he can get sober.  Pt. Is not interested in inpatient treatment at this time, states that he would like to do outpatient services.  Pt. To start Zyprexa tonight.        The patient's goals for the shift include \"I want my clothes back\"    The clinical goals for the shift include Complete admission    Problem: Patient is a Smoker  Goal: Improve Smoking Status  Outcome: Progressing     Problem: Sensory Perceptual Alteration as Evidenced by  Goal: Cooperates with admission process  Outcome: Progressing  Goal: Patient/Family participate in treatment and discharge plans  Outcome: Progressing  Goal: Patient/Family verbalizes awareness of resources  Outcome: Progressing  Goal: Participates in unit activities  Outcome: Progressing  Goal: Discusses signs/symptoms of illness/treatment options  Outcome: Progressing  Goal: Initiates reality-based interactions  Outcome: Progressing  Goal: Able to discuss content of hallucinations/delusions  Outcome: Progressing  Goal: Notifies staff when experiencing hallucinations/delusions  Outcome: Progressing  Goal: Verbalizes reduction in hallucinations/delusions  Outcome: Progressing  Goal: Will not act on psychotic perception  Outcome: Progressing  Goal: Understands least restrictive measures  Outcome: Progressing  Goal: Free from restraint events  Outcome: Progressing     Problem: Altered Thought Processes as Evidenced by  Goal: STG - Desires improvement in ability to think and concentrate  Outcome: Progressing  Goal: STG - Participates in Occupational Therapy and other cognitive assessments  Outcome: " Progressing     Problem: Potential for Harm to Self or Others  Goal: Cooperates with admission process  Outcome: Progressing  Goal: Participates in unit activities  Outcome: Progressing  Goal: Patient/Family participate in treatment and discharge plans  Outcome: Progressing  Goal: Identifies deescalation techniques  Outcome: Progressing  Goal: Understands least restrictive measures  Outcome: Progressing  Goal: Identifies stressors that lead to harmful behaviors  Outcome: Progressing  Goal: Notifies staff when experiencing harmful thoughts toward self/others  Outcome: Progressing  Goal: Denies harm toward self or others  Outcome: Progressing  Goal: Free from restraint events  Outcome: Progressing     Problem: Educational/Scholastic Disruption  Goal: Meets educational requirements during hospitalization  Outcome: Progressing  Goal: Attends class without disruptive behavior  Outcome: Progressing  Goal: Completes daily assignments  Outcome: Progressing     Problem: Ineffective Coping  Goal: Cooperates with admission process  Outcome: Progressing  Goal: Identifies ineffective coping skills  Outcome: Progressing  Goal: Identifies healthy coping skills  Outcome: Progressing  Goal: Demonstrates healthy coping skills  Outcome: Progressing  Goal: Participates in unit activities  Outcome: Progressing  Goal: Patient/Family participate in treatment and discharge plans  Outcome: Progressing  Goal: Patient/Family verbalizes awareness of resources  Outcome: Progressing  Goal: Understands least restrictive measures  Outcome: Progressing  Goal: Free from restraint events  Outcome: Progressing     Problem: Alteration in Sleep  Goal: STG - Reports nightly sleep, duration, and quality  Outcome: Progressing  Goal: STG - Identifies sleep hygiene aids  Outcome: Progressing  Goal: STG - Informs staff if unable to sleep  Outcome: Progressing  Goal: STG - Attends breathing and relaxation group  Outcome: Progressing     Problem: Potential for  Substance Withdrawal  Goal: Verbalizes signs/symptoms of withdrawal  Outcome: Progressing  Goal: Reports signs/symptoms of withdrawal  Outcome: Progressing  Goal: Free of withdrawal symptoms  Outcome: Progressing     Problem: Anxiety  Goal: Patient/family understands admission protocols  Outcome: Progressing  Goal: Attempts to manage anxiety with help  Outcome: Progressing  Goal: Verbalizes ways to manage anxiety  Outcome: Progressing  Goal: Implements measures to reduce anxiety  Outcome: Progressing  Goal: Free from restraint events  Outcome: Progressing     Problem: Self Care Deficit  Goal: STG - Patient completes hygiene  Outcome: Progressing  Goal: Increase group attendance  Outcome: Progressing  Goal: Accepts need for medications  Outcome: Progressing  Goal: STG - Goes to and eats meals independently in dining room 100% of time  Outcome: Progressing     Problem: Defensive Coping  Goal: Cooperates with admission process  Outcome: Progressing  Goal: Identifies reckless/dangerous behavior  Outcome: Progressing  Goal: Identifies stressors that lead to reckless/dangerous behavior  Outcome: Progressing  Goal: Discusses and identifies healthy coping skills  Outcome: Progressing  Goal: Demonstrates healthy coping skills  Outcome: Progressing  Goal: Identifies appropriate social interaction  Outcome: Progressing  Goal: Demonstrates appropriate social interactions  Outcome: Progressing  Goal: Patient/Family verbalizes awareness of resources  Outcome: Progressing  Goal: Discusses signs/symptoms of illness/treatment options  Outcome: Progressing  Goal: Patient/Family participate in treatment and discharge plans  Outcome: Progressing  Goal: Understands least restrictive measures  Outcome: Progressing  Goal: Free from restraint events  Outcome: Progressing
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
## 2024-02-10 LAB
CHOLEST SERPL-MCNC: 146 MG/DL (ref 0–199)
CHOLESTEROL/HDL RATIO: 3.8
EST. AVERAGE GLUCOSE BLD GHB EST-MCNC: 94 MG/DL
HBA1C MFR BLD: 4.9 %
HDLC SERPL-MCNC: 38.1 MG/DL
LDLC SERPL CALC-MCNC: 96 MG/DL
NON HDL CHOLESTEROL: 108 MG/DL (ref 0–149)
TRIGL SERPL-MCNC: 60 MG/DL (ref 0–149)
TSH SERPL-ACNC: 3.73 MIU/L (ref 0.44–3.98)
VLDL: 12 MG/DL (ref 0–40)

## 2024-02-10 PROCEDURE — 97530 THERAPEUTIC ACTIVITIES: CPT | Mod: GO

## 2024-02-10 PROCEDURE — 83036 HEMOGLOBIN GLYCOSYLATED A1C: CPT | Mod: ELYLAB | Performed by: REGISTERED NURSE

## 2024-02-10 PROCEDURE — 2500000001 HC RX 250 WO HCPCS SELF ADMINISTERED DRUGS (ALT 637 FOR MEDICARE OP): Performed by: REGISTERED NURSE

## 2024-02-10 PROCEDURE — 1240000001 HC SEMI-PRIVATE BH ROOM DAILY

## 2024-02-10 PROCEDURE — 84443 ASSAY THYROID STIM HORMONE: CPT | Performed by: REGISTERED NURSE

## 2024-02-10 PROCEDURE — 2500000002 HC RX 250 W HCPCS SELF ADMINISTERED DRUGS (ALT 637 FOR MEDICARE OP, ALT 636 FOR OP/ED): Performed by: REGISTERED NURSE

## 2024-02-10 PROCEDURE — 99232 SBSQ HOSP IP/OBS MODERATE 35: CPT | Performed by: PSYCHIATRY & NEUROLOGY

## 2024-02-10 PROCEDURE — 97165 OT EVAL LOW COMPLEX 30 MIN: CPT | Mod: GO

## 2024-02-10 PROCEDURE — 2500000001 HC RX 250 WO HCPCS SELF ADMINISTERED DRUGS (ALT 637 FOR MEDICARE OP): Performed by: NURSE PRACTITIONER

## 2024-02-10 PROCEDURE — 80061 LIPID PANEL: CPT | Performed by: REGISTERED NURSE

## 2024-02-10 PROCEDURE — 36415 COLL VENOUS BLD VENIPUNCTURE: CPT | Performed by: REGISTERED NURSE

## 2024-02-10 RX ADMIN — TRAZODONE HYDROCHLORIDE 50 MG: 50 TABLET ORAL at 20:46

## 2024-02-10 RX ADMIN — SALINE NASAL SPRAY 1 SPRAY: 1.5 SOLUTION NASAL at 11:50

## 2024-02-10 RX ADMIN — NICOTINE POLACRILEX 4 MG: 2 GUM, CHEWING BUCCAL at 13:40

## 2024-02-10 RX ADMIN — NICOTINE POLACRILEX 4 MG: 2 GUM, CHEWING BUCCAL at 15:30

## 2024-02-10 RX ADMIN — SALINE NASAL SPRAY 1 SPRAY: 1.5 SOLUTION NASAL at 18:26

## 2024-02-10 RX ADMIN — OLANZAPINE 5 MG: 5 TABLET, FILM COATED ORAL at 20:46

## 2024-02-10 ASSESSMENT — PAIN SCALES - GENERAL: PAINLEVEL_OUTOF10: 0 - NO PAIN

## 2024-02-10 ASSESSMENT — PAIN - FUNCTIONAL ASSESSMENT: PAIN_FUNCTIONAL_ASSESSMENT: 0-10

## 2024-02-10 NOTE — PROGRESS NOTES
"Renan Zhu is a 28 y.o. male on day 1 of admission presenting with MDD (major depressive disorder), recurrent episode, moderate (CMS/HCC).    SUBJECTIVE:  Pt was using cocaine, percocet. Cocaine was making him sad and depressed, but denies having any suicidal thoughts.   I wanted to be detoxed and [facility] send me to [facility]. Wanted to sign AMA when he got pink slipped  Pt denies using any xanax  Percocet - not using it daily - last use was a week ago  Pt denies hurting himself ever in the past.  Want to do OP rehab  No AH or VH  No paranoid thoughts    Exam:  Vital Signs: /78   Pulse 62   Temp 36.6 °C (97.9 °F)   Resp 16   Ht 1.676 m (5' 6\")   Wt 56.7 kg (125 lb)   SpO2 97%   BMI 20.18 kg/m²   Musculoskeletal: Muscle tone: WNL  Gait/Station: normal      Mental Status Exam:  General Appearance: stated age  Speech: soft  Mood: anxious  Affect: appropriate  Thought Process: Linear, goal directed  Thought Associations: No loosening of associations  Thought Content: normal  Perception: No perceptual abnormalities noted  Level of Consciousness: Alert  Orientation: Alert and oriented to person, place, time and situation  Attention and Concentration: Intact  Cognition:  Insight: fair  Judgment: fair     Results for orders placed or performed during the hospital encounter of 02/09/24 (from the past 96 hour(s))   Bilirubin, Direct   Result Value Ref Range    Bilirubin, Direct <0.2 0.0 - 0.2 mg/dL   ECG 12 lead   Result Value Ref Range    Ventricular Rate 51 BPM    Atrial Rate 51 BPM    WA Interval 126 ms    QRS Duration 100 ms    QT Interval 422 ms    QTC Calculation(Bazett) 388 ms    P Axis 53 degrees    R Axis 93 degrees    T Axis 67 degrees    QRS Count 9 beats    Q Onset 220 ms    P Onset 157 ms    P Offset 195 ms    T Offset 431 ms    QTC Fredericia 400 ms   Lipid Panel   Result Value Ref Range    Cholesterol 146 0 - 199 mg/dL    HDL-Cholesterol 38.1 mg/dL    Cholesterol/HDL Ratio 3.8     " LDL Calculated 96 <=99 mg/dL    VLDL 12 0 - 40 mg/dL    Triglycerides 60 0 - 149 mg/dL    Non HDL Cholesterol 108 0 - 149 mg/dL           Impression:   Polydrug abuse  Substance induced mood diorder    Recommendations:    Impression:    Medication reviewed. To continue as ordered  Blood test: reviewed  Encouraged patient to attend group and other mileu activity  Collateral from family - pending  Discharge planing        Thank you for allowing us to participate in the care of this patient.       Marin Bailey MD  2/10/2024  8:28 AM

## 2024-02-10 NOTE — PROGRESS NOTES
Occupational Therapy     REHAB Therapy Assessment & Treatment    Patient Name: Renan Zhu  MRN: [id number]  Today's Date: 2/10/2024           Attendance:  Attendance  Activity: Discussion/reminisce (Mental Health Discussion)  Participation: Active participation    Therapeutic Recreation:  Treatment Approach  Approach :  (60 minutes)  Patient Stated Goals: Pt identified goals relating to changing his thinking patterns, and staying away from particular crowds following DC.  Cognition: Attention, Orientation (Pt alert and oriented, attentive throughout duration of group.  Contributions made to discussion were linear, organized and on topic.)  Social Skills: Cooperates with others in group activity, Interacts independently in social activity (Encouraging and supportive to peers)  Emotional:  (Pt restless during group, brighter more animated affect.)  Stress Management/Relaxation Training:  (Pt able to identify positive coping tools to use for stress management including music and the gym.)  Treatment Approach Comments: Patient attended group with active participation.  Processed admission into the hospital and choosing to get help for his chemical dependency and mental health issues as opposed to in the past when it was court ordered.  Patient displayed insight when discussing changes that he needs to make to promote his recovery following DC.  pt was able to identify barriers he has experienced in the past when attempting recovery and was receptive to information provided by therapist.  Supportive of other peers in the group.  Encouraged to continue to attend occupational therapy sessions while on unit.      Encounter Problems       Encounter Problems (Active)       OT Goals       Communication/Interaction Skills (Progressing)       Start:  02/10/24    Expected End:  03/09/24       Communication/Interaction Skills (Self-expression/social Behavior/assertive)    Explore/demonstrate 1-2 effective methods of  expressing feelings/wants/needs (can include assertion/engaging/sharing/asking) prior to discharge          Coping Skills (Progressing)       Start:  02/10/24    Expected End:  03/09/24       Coping Skills      Explore/identify 1-2 effective strategies of expressing feelings, wants, needs(can include assertion, engaging, sharing, asking) prior to discharge          Emotion Regulation/Self Control (Progressing)       Start:  02/10/24    Expected End:  03/09/24        Emotion Regulation/self control      Pt will exhibit appropriate range, regulation of emotions, impulse control, frustration tolerance in OT groups prior to discharge.                    Education Documentation  No documentation found.  Education Comments  No comments found.          Additional Comments:

## 2024-02-10 NOTE — CARE PLAN
The patient's goals for the shift include Can I get a phone?  I need to make some calls.    The clinical goals for the shift include Pt will sleep > 6 hours tonight.    Pt was in the day room with his roommate throughout the shift. He may have given his roommate a piece of his nicotine gum. Pt went to group today and was also talking with staff about sobriety, DUIs. Pt was calm and cooperative for the shift.

## 2024-02-10 NOTE — CARE PLAN
The patient's goals for the shift include Can I get a phone?  I need to make some calls.    The clinical goals for the shift include Pt will sleep > 6 hours tonight.    Pt has been visible on unit, calm and cooperative.  Pt denies SI, HI, and A/V hallucinations.  Pt compliant with medications.  Pt given trazodone for sleep.    Pt appeared to sleep throughout the night.    Problem: Potential for Harm to Self or Others  Goal: Identifies stressors that lead to harmful behaviors  Outcome: Progressing  Goal: Notifies staff when experiencing harmful thoughts toward self/others  Outcome: Progressing  Goal: Free from restraint events  Outcome: Progressing     Problem: Ineffective Coping  Goal: Demonstrates healthy coping skills  Outcome: Progressing     Problem: Alteration in Sleep  Goal: STG - Reports nightly sleep, duration, and quality  Outcome: Progressing  Goal: STG - Identifies sleep hygiene aids  Outcome: Progressing  Goal: STG - Informs staff if unable to sleep  Outcome: Progressing     Problem: Anxiety  Goal: Attempts to manage anxiety with help  Outcome: Progressing     Problem: Potential for Harm to Self or Others  Goal: Denies harm toward self or others  Outcome: Met

## 2024-02-10 NOTE — PROGRESS NOTES
"Occupational Therapy    Evaluation    Patient Name: Renan Zhu  MRN: [id number]  Today's Date: 2/10/2024  Time Calculation  Start Time: 1130  Stop Time: 1155  Time Calculation (min): 25 min    Assessment  IP OT Assessment  OT Assessment: Emotional Regulation (Pt anxious during evaluation, restless with difficulty sitting still.  Overall pt was polite and cooperative.  Minimizing symptoms of depression stating \"everyone gets down sometimes.\"  Pt states he receives support from mother, sister and friend.)    Plan:   Occupational Therapy Intervention Plan:    OT Interventions: Therapeutic use of self/activities, OT Task Skills Group/1:1, OT Life Management Skills Group/1:1, Grief/Anger Management Group/1:1, ADL/IADL Group/1:1     Subjective   Current Problem:  No diagnosis found.  General:  General  Reason for Referral: Per admission report- Patient is a 28-year-old male with past medical history of substance abuse presenting with suicidal ideations.  He says that he has had daily thoughts of harming himself, without plan.  He denies homicidal ideations.  Today he went to [facility], looking to get help in expressing his thoughts of sadness and wishing to harm himself.  They subsequently had him brought to the emergency department for evaluation. (Impaired IADLs/Safety)  Past Medical History Relevant to Rehab: Pt reports history of substance use, states he began using cocaine at age 15.  History of MDD and anxiety.  General Comment: Stressors (Stressors related to chronic substance use, increased symptoms of depression, limited social supports.  Pt reports he was given an ultimatum from a friend who he is currently staying with to get sober in order to continue living arrangement.)       Objective   Cognition:  Overall Cognitive Status: Within Functional Limits  Orientation Level: Oriented X4  Attention: Within Functional Limits  Problem Solving:  (Impaired)  Safety/Judgement:  (Impaired)  Insight:  " (Impaired)           Home Living:  Type of Home: House  Lives With:  (Currently residing with friend Edwin, Edwin's significant other and their 4 children.)   Prior Function:  Level of [place]: Independent with ADLs and functional transfers  Vocational: Full time employment  Leisure: Boxing, music, going to the gym, reading.  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  Reports enjoyment of work and that he makes good money.  Pt works day shift hours.  Leisure and Hobbies: Boxing, music, reading, working out.  IADL Comments: Mental Health (Pt not currently meeting with any outpt mental health providers.  Future oriented goals to return to [facility] to receive chemical dependency treatment.  Pt not verbalizing interest at this time in addressing mental health symptoms of depression.)  ADL:  ADL Comments: Pt denying difficulties with ADLs at this time, however later shares that when he is using substances he sneaks into house at 4 am while everyone is asleep so he won't be confronted by friend Edwin.  Pt also states that his work is physically demanding so on his off days he usually sleeps all day to catch up on rest.  Pt could benefit from education on creating balanced schedule that includes time for rest, self care and leisure.     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  Reports enjoyment of work and that he makes good money.  Pt works day shift hours.  Leisure and Hobbies: Boxing, music, reading, working out.  IADL Comments: Mental Health (Pt not currently meeting with any outpt mental health providers.  Future oriented goals to return to [facility] to receive chemical dependency treatment.  Pt not verbalizing interest at this time in addressing mental health symptoms of depression.)    Education Documentation  No documentation found.  Education Comments  No comments found.      Goals:   Encounter Problems       Encounter Problems (Active)       OT Goals       Communication/Interaction Skills       Start:  02/10/24    Expected End:  03/09/24       Communication/Interaction Skills (Self-expression/social Behavior/assertive)    Explore/demonstrate 1-2 effective methods of expressing feelings/wants/needs (can include assertion/engaging/sharing/asking) prior to discharge          Coping Skills       Start:  02/10/24    Expected End:  03/09/24       Coping Skills      Explore/identify 1-2 effective strategies of expressing feelings, wants, needs(can include assertion, engaging, sharing, asking) prior to discharge          Emotion Regulation/Self Control       Start:  02/10/24    Expected End:  03/09/24        Emotion Regulation/self control      Pt will exhibit appropriate range, regulation of emotions, impulse control, frustration tolerance in OT groups prior to discharge.

## 2024-02-11 LAB
ATRIAL RATE: 51 BPM
P AXIS: 53 DEGREES
P OFFSET: 195 MS
P ONSET: 157 MS
PR INTERVAL: 126 MS
Q ONSET: 220 MS
QRS COUNT: 9 BEATS
QRS DURATION: 100 MS
QT INTERVAL: 422 MS
QTC CALCULATION(BAZETT): 388 MS
QTC FREDERICIA: 400 MS
R AXIS: 93 DEGREES
T AXIS: 67 DEGREES
T OFFSET: 431 MS
VENTRICULAR RATE: 51 BPM

## 2024-02-11 PROCEDURE — 2500000001 HC RX 250 WO HCPCS SELF ADMINISTERED DRUGS (ALT 637 FOR MEDICARE OP): Performed by: REGISTERED NURSE

## 2024-02-11 PROCEDURE — 99231 SBSQ HOSP IP/OBS SF/LOW 25: CPT | Performed by: PSYCHIATRY & NEUROLOGY

## 2024-02-11 PROCEDURE — 1240000001 HC SEMI-PRIVATE BH ROOM DAILY

## 2024-02-11 PROCEDURE — 2500000002 HC RX 250 W HCPCS SELF ADMINISTERED DRUGS (ALT 637 FOR MEDICARE OP, ALT 636 FOR OP/ED): Performed by: REGISTERED NURSE

## 2024-02-11 PROCEDURE — 97530 THERAPEUTIC ACTIVITIES: CPT | Mod: GO

## 2024-02-11 RX ADMIN — TRAZODONE HYDROCHLORIDE 50 MG: 50 TABLET ORAL at 21:13

## 2024-02-11 RX ADMIN — NICOTINE POLACRILEX 4 MG: 2 GUM, CHEWING BUCCAL at 18:52

## 2024-02-11 RX ADMIN — NICOTINE POLACRILEX 4 MG: 2 GUM, CHEWING BUCCAL at 14:02

## 2024-02-11 RX ADMIN — SALINE NASAL SPRAY 1 SPRAY: 1.5 SOLUTION NASAL at 17:17

## 2024-02-11 RX ADMIN — SALINE NASAL SPRAY 1 SPRAY: 1.5 SOLUTION NASAL at 11:26

## 2024-02-11 RX ADMIN — OLANZAPINE 5 MG: 5 TABLET, FILM COATED ORAL at 21:13

## 2024-02-11 RX ADMIN — SALINE NASAL SPRAY 1 SPRAY: 1.5 SOLUTION NASAL at 21:34

## 2024-02-11 ASSESSMENT — PAIN SCALES - GENERAL
PAINLEVEL_OUTOF10: 0 - NO PAIN
PAINLEVEL_OUTOF10: 0 - NO PAIN

## 2024-02-11 ASSESSMENT — PAIN - FUNCTIONAL ASSESSMENT
PAIN_FUNCTIONAL_ASSESSMENT: 0-10
PAIN_FUNCTIONAL_ASSESSMENT: 0-10

## 2024-02-11 NOTE — PROGRESS NOTES
"Renan Zhu is a 28 y.o. male on day 2 of admission presenting with MDD (major depressive disorder), recurrent episode, moderate (CMS/HCC).    SUBJECTIVE:    DC focused  Want to stay clean on discharge  Mood has been stable  No hopeless or worthless feeling  No AH or VH  No paranoid thoughts  Want to do OP rehab    Exam:  Vital Signs: /79   Pulse 56   Temp 36.5 °C (97.7 °F)   Resp 16   Ht 1.676 m (5' 6\")   Wt 56.7 kg (125 lb)   SpO2 99%   BMI 20.18 kg/m²   Musculoskeletal: Muscle tone: WNL  Gait/Station: normal      Mental Status Exam:  General Appearance: stated age  Speech: soft  Mood: anxious  Affect: appropriate  Thought Process: Linear, goal directed  Thought Associations: No loosening of associations  Thought Content: normal  Perception: No perceptual abnormalities noted  Level of Consciousness: Alert  Orientation: Alert and oriented to person, place, time and situation  Attention and Concentration: Intact  Cognition:  Insight: fair  Judgment: fair     Results for orders placed or performed during the hospital encounter of 02/09/24 (from the past 96 hour(s))   Bilirubin, Direct   Result Value Ref Range    Bilirubin, Direct <0.2 0.0 - 0.2 mg/dL   ECG 12 lead   Result Value Ref Range    Ventricular Rate 51 BPM    Atrial Rate 51 BPM    KS Interval 126 ms    QRS Duration 100 ms    QT Interval 422 ms    QTC Calculation(Bazett) 388 ms    P Axis 53 degrees    R Axis 93 degrees    T Axis 67 degrees    QRS Count 9 beats    Q Onset 220 ms    P Onset 157 ms    P Offset 195 ms    T Offset 431 ms    QTC Fredericia 400 ms   Hemoglobin A1C   Result Value Ref Range    Hemoglobin A1C 4.9 see below %    Estimated Average Glucose 94 Not Established mg/dL   Lipid Panel   Result Value Ref Range    Cholesterol 146 0 - 199 mg/dL    HDL-Cholesterol 38.1 mg/dL    Cholesterol/HDL Ratio 3.8     LDL Calculated 96 <=99 mg/dL    VLDL 12 0 - 40 mg/dL    Triglycerides 60 0 - 149 mg/dL    Non HDL Cholesterol 108 0 - 149 " mg/dL   Thyroid Stimulating Hormone   Result Value Ref Range    Thyroid Stimulating Hormone 3.73 0.44 - 3.98 mIU/L           Impression:   Polydrug abuse  Substance induced mood diorder    Recommendations:    Impression:    Medication reviewed. To continue as ordered  Blood test: reviewed  Encouraged patient to attend group and other mileu activity  Collateral from family - pending  Discharge planing- possibly tomorrow        Thank you for allowing us to participate in the care of this patient.       Marin Bailey MD  2/11/2024  9:31 AM

## 2024-02-11 NOTE — CARE PLAN
The patient's goals for the shift include The doctor says I get to leave on Monday.  Can I get some nose spray so I can go to sleep?    The clinical goals for the shift include Pt will sleep > 6 hours tonight.      Problem: Ineffective Coping  Goal: Identifies ineffective coping skills  Outcome: Not Progressing  Goal: Identifies healthy coping skills  Outcome: Not Progressing  Goal: Demonstrates healthy coping skills  Outcome: Not Progressing     Problem: Defensive Coping  Goal: Identifies reckless/dangerous behavior  Outcome: Not Progressing  Goal: Demonstrates healthy coping skills  Outcome: Not Progressing  Goal: Identifies appropriate social interaction  Outcome: Not Progressing  Goal: Demonstrates appropriate social interactions  Outcome: Not Progressing    Pt is inappropriate with staff at times and indirectly influences the behavior of his roommate. He is redirected but he is flippant at times when speaking to him directly about his behavior.

## 2024-02-11 NOTE — CARE PLAN
The patient's goals for the shift include The doctor says I get to leave on Monday.  Can I get some nose spray so I can go to sleep?    The clinical goals for the shift include Pt will sleep > 6 hours tonight.    Pt reports he's feeling good and is happy that the doctor told him he would be able to leave the unit Monday.  Pt denies SI, HI, and A/V hallucinations.  Pt has been visible on unit and social with peers.  Pt appears anxious.  Pt affect is reactive.  Pt compliant with medications.    Pt appeared to sleep throughout the night.    Problem: Patient is a Smoker  Goal: Improve Smoking Status  Outcome: Progressing     Problem: Sensory Perceptual Alteration as Evidenced by  Goal: Notifies staff when experiencing hallucinations/delusions  Outcome: Progressing  Goal: Free from restraint events  Outcome: Progressing     Problem: Altered Thought Processes as Evidenced by  Goal: STG - Desires improvement in ability to think and concentrate  Outcome: Progressing     Problem: Potential for Harm to Self or Others  Goal: Notifies staff when experiencing harmful thoughts toward self/others  Outcome: Progressing     Problem: Ineffective Coping  Goal: Demonstrates healthy coping skills  Outcome: Progressing     Problem: Alteration in Sleep  Goal: STG - Reports nightly sleep, duration, and quality  Outcome: Progressing  Goal: STG - Informs staff if unable to sleep  Outcome: Progressing     Problem: Sensory Perceptual Alteration as Evidenced by  Goal: Initiates reality-based interactions  Outcome: Met  Goal: Verbalizes reduction in hallucinations/delusions  Outcome: Met     Problem: Anxiety  Goal: Implements measures to reduce anxiety  Outcome: Met     Problem: Self Care Deficit  Goal: Accepts need for medications  Outcome: Met

## 2024-02-11 NOTE — PROGRESS NOTES
"Occupational Therapy     REHAB Therapy Assessment & Treatment    Patient Name: Renan Zhu  MRN: [id number]  Today's Date: 2/11/2024    Attendance:  Attendance  Activity: One-to-one  Participation: Active participation    Therapeutic Activity: Recognizing the stages of relapse & recovery  Treatment Approach  Approach : 30 to 45 minutes, 1 to1 Therapy sessions (\"Name That Stage\" reviewed re: sobriety)  Patient Stated Goals: \"I really want to go to residential treatment, I think,\" per Pt.  Cognition: Attention (Pt follows complex multi-step directions independently & orPt demonstrates good, organized understanding of info being taught & processed.)  Social Skills: Demonstrates ability to listen to others  Emotional: Mood (Pt mood hopeful re: potential to be admitted into residential treatment, affect animated, occasionally stands while talking when feeling anxious as in discussing inpatient stay vs d/c home from [facility].)  Treatment Approach Comments: Pt w/active listening, re: Stages of Relaps/Recovery, \"I'm in the \"Commitment\" stage of my sobriety! I really want to get back to work,\" per Pt. However, Pt is \"On the Fence\" about committing to inpatient stay vs outpatient services while recognizing the triggers in his homelife. Pt reviewed the  \"Adopting a Healthy Lifestyle\" paper & noted the need to more assertive in asking for help. Pt noted his roommate Edwin & friend Tahira being upset that Pt hadn't shared his C.D. issues and severe anxiety. skills to get needs met & improve interpersonal relations. Pt reviewed Stages of Relapse to learn what \"Stinkin' thinkin\" is re: Irrational thinking and Reframing your Thoughts to decrease anxiety. Pt was given a paper to review for next session on relapse prevention w/rational problem solving tips.      Encounter Problems       Encounter Problems (Active)       OT Goals       Communication/Interaction Skills (Progressing)       Start:  02/10/24    Expected End:  " 03/09/24       Communication/Interaction Skills (Self-expression/social Behavior/assertive)    Explore/demonstrate 1-2 effective methods of expressing feelings/wants/needs (can include assertion/engaging/sharing/asking) prior to discharge          Coping Skills (Progressing)       Start:  02/10/24    Expected End:  03/09/24       Coping Skills      Explore/identify 1-2 effective strategies of expressing feelings, wants, needs(can include assertion, engaging, sharing, asking) prior to discharge          Emotion Regulation/Self Control (Progressing)       Start:  02/10/24    Expected End:  03/09/24        Emotion Regulation/self control      Pt will exhibit appropriate range, regulation of emotions, impulse control, frustration tolerance in OT groups prior to discharge.              Educational Documents provided for Pt to complete for next session on (+) coping tools for gaining sobriety.    Additional Comments: Pt appeared motivated throughout the session though c/o anxiety on occasion re: discussion on inpatient stay in a C.D. program.

## 2024-02-12 ENCOUNTER — PHARMACY VISIT (OUTPATIENT)
Dept: PHARMACY | Facility: CLINIC | Age: 29
End: 2024-02-12
Payer: MEDICAID

## 2024-02-12 VITALS
HEART RATE: 60 BPM | RESPIRATION RATE: 18 BRPM | HEIGHT: 66 IN | TEMPERATURE: 97.5 F | OXYGEN SATURATION: 99 % | WEIGHT: 125 LBS | SYSTOLIC BLOOD PRESSURE: 112 MMHG | BODY MASS INDEX: 20.09 KG/M2 | DIASTOLIC BLOOD PRESSURE: 71 MMHG

## 2024-02-12 PROCEDURE — 97150 GROUP THERAPEUTIC PROCEDURES: CPT | Mod: GO

## 2024-02-12 PROCEDURE — 99239 HOSP IP/OBS DSCHRG MGMT >30: CPT | Performed by: PSYCHIATRY & NEUROLOGY

## 2024-02-12 RX ORDER — OLANZAPINE 5 MG/1
5 TABLET ORAL NIGHTLY
Qty: 30 TABLET | Refills: 0 | Status: SHIPPED | OUTPATIENT
Start: 2024-02-12 | End: 2024-03-13

## 2024-02-12 RX ORDER — OLANZAPINE 5 MG/1
5 TABLET ORAL NIGHTLY
Qty: 30 TABLET | Refills: 0 | Status: SHIPPED | OUTPATIENT
Start: 2024-02-12 | End: 2024-02-12 | Stop reason: SDUPTHER

## 2024-02-12 ASSESSMENT — COLUMBIA-SUICIDE SEVERITY RATING SCALE - C-SSRS
2. HAVE YOU ACTUALLY HAD ANY THOUGHTS OF KILLING YOURSELF?: NO
1. SINCE LAST CONTACT, HAVE YOU WISHED YOU WERE DEAD OR WISHED YOU COULD GO TO SLEEP AND NOT WAKE UP?: NO
6. HAVE YOU EVER DONE ANYTHING, STARTED TO DO ANYTHING, OR PREPARED TO DO ANYTHING TO END YOUR LIFE?: NO

## 2024-02-12 ASSESSMENT — PAIN SCALES - GENERAL: PAINLEVEL_OUTOF10: 0 - NO PAIN

## 2024-02-12 ASSESSMENT — PAIN - FUNCTIONAL ASSESSMENT: PAIN_FUNCTIONAL_ASSESSMENT: 0-10

## 2024-02-12 NOTE — DISCHARGE SUMMARY
Discharge Diagnosis:  MDD (major depressive disorder), recurrent episode, moderate (CMS/HCC)    Hospital Course:  Patient is a 28-year-old male with a history of bipolar disorder and cocaine use disorder who was admitted to [facility] for suicidal ideation. Due to acutely elevated and imminent risk for self-harm/harm to others, patient required a level of care equivalent to inpatient hospitalization for safety, evaluation, treatment and stabilization.     The patient was admitted to [facility] under the care of Dr. Huynh, restricted to the dixon and placed on suicide, behavior and elopement precautions.  At the beginning of hospitalization, patient presented to the ED from [facility] for psychiatric evaluation after he expressed suicidal ideation.  Patient reported that he had been using Percocet, cocaine, and Xanax daily.  Patient reported that his substance use was significantly worsening his mental health.  Patient later stated that he only uses cocaine and marijuana daily, reported he only uses Percocet occasionally.  Patient also reported that he was recently kicked out of his friend's home.  Patient expresses desire to get off drugs as he understands how it is negatively affecting his mental health.  Patient reports that he has been struggling with worsening mental illness since his dad  in .  Patient does endorse symptoms of xander including impulsivity, irritability, mood swings in the absence of substance use.  Patient calm and cooperative on [facility] was irritable and impulsive in the ED, punching the walls.  Patient denies any history of psychosis.  Patient reported he has been using cocaine since he was in 6th grade.  Stated that he is interested in outpatient substance use treatment.       The treatment team made the following interventions: medication, group/milieu therapy, individual therapy    Over the course of hospitalization, patient reported improvement and  objective signs of improvement were noted by staff.  Patient ported improved mood and sleep.  Patient actively unit participating group therapy, socializing with peers.  Patient choices, looking forward to starting his recovery.  Plans to follow-up with [facility] for outpatient substance use and psychiatric treatment.    Psychiatric Medications: Zyprexa 5 mg oral daily at bedtime.  Patient tolerated medications without side effects.    Prior to the date of discharge, patient was able to contract for safety and stated they felt safe and appropriate for discharge.  The treatment team found the patient not to be an imminent danger to self or others.  The patient denied suicidal or homicidal ideation and did not endorse auditory and visual hallucinations.  The patient's condition at the time of discharge was stable and initial symptoms improved over the course of hospitalization.    The patient was discharged home under the supervision of family with a 30-day supply of Zyprexa 5 mg oral daily at bedtime.    The patient was instructed to call the patient's outpatient provider in the event of worsening symptoms or medication side effects.  Should the patient be unable to maintain their personal safety or the safety of others, instructions were provided to dial 9-1-1 or go to the closest emergency room.    Discharge Mental Status Exam:  General:  Patient is awake, alert, and oriented to person, place, time, and situation.    Appearance:  Appears well-hydrated, well-nourished, and well-groomed and approximately stated age.   Attitude:  Patient was calm and cooperative throughout the interview, which is appropriate to the context of the interview and the topics discussed.   Behavior:  Eye contact is appropriate with topics of discussion.   Motor Activity:  Motor activity is normal. No psychomotor disturbances or abnormal involuntary movements were noted, including psychomotor agitation, psychomotor retardation,  involuntary movements, extrapyramidal symptoms, akathisia, or tardive dyskinesia. Gait is normal.   Speech:  Speech is spontaneous, coherent, fluent and of appropriate quantity, rate, volume, and tone and non-vulgar/vulgar.  Speech and mannerisms are consistent with topics of discussion.   Affect:  Euthymic with a full range, mood congruent and appropriate to content.   Thought Process:  Thought process was linear, organized, and goal-directed and devoid of loose associations, flight of ideas, thought blocking or tangents.   Thought Content:  Thought content was devoid of suicidal ideation or intent, homicidal ideation or intent, self-harm ideation or intent, delusions, illusions, obsessions, or paranoia.   Thought Perception:  Did not endorse auditory or visual hallucinations. Patient did not appear to be internally distracted or preoccupied.   Cognition:  Knowledge and intelligence are believed to be average.  Recent and remote memory, fund of knowledge, and abstract reasoning appear grossly intact, appropriate for age and education, and there are no impairments in attention, concentration, or language.   Insight:  Insight regarding psychiatric conditions is good.   Judgment:  Judgment is good.    Recent Labs:  No results found for this or any previous visit (from the past 24 hour(s)).     Risk Assessment at Discharge:  Violence Risk Assessment: major mental illness, male, and substance abuse  Acute Risk of Harm to Others is Considered: low   Risk Mitigated by: Adherence to treatment, strong therapeutic alliance. Follow-up.    Suicide Risk Assessment: , current psychiatric illness, male, and substance abuse  Protective Factors against Suicide: adherence to  treatment, employment, sense of responsibility toward family, and social support/connectedness  Acute Risk of Harm to Self is Considered: low  Risk Mitigated by: Adherence to treatment, strong therapeutic alliance. Follow-up.      Salvador Machado,  APRN-CNP

## 2024-02-12 NOTE — CARE PLAN
Problem: Patient is a Smoker  Goal: Improve Smoking Status  Outcome: Progressing     Problem: Sensory Perceptual Alteration as Evidenced by  Goal: Cooperates with admission process  Outcome: Progressing  Goal: Patient/Family participate in treatment and discharge plans  Outcome: Progressing  Goal: Patient/Family verbalizes awareness of resources  Outcome: Progressing  Goal: Participates in unit activities  Outcome: Progressing  Goal: Discusses signs/symptoms of illness/treatment options  Outcome: Progressing  Goal: Able to discuss content of hallucinations/delusions  Outcome: Progressing  Goal: Notifies staff when experiencing hallucinations/delusions  Outcome: Progressing  Goal: Will not act on psychotic perception  Outcome: Progressing  Goal: Understands least restrictive measures  Outcome: Progressing  Goal: Free from restraint events  Outcome: Progressing     Problem: Altered Thought Processes as Evidenced by  Goal: STG - Desires improvement in ability to think and concentrate  Outcome: Progressing  Goal: STG - Participates in Occupational Therapy and other cognitive assessments  Outcome: Progressing     Problem: Potential for Harm to Self or Others  Goal: Cooperates with admission process  Outcome: Progressing  Goal: Participates in unit activities  Outcome: Progressing  Goal: Patient/Family participate in treatment and discharge plans  Outcome: Progressing  Goal: Identifies deescalation techniques  Outcome: Progressing  Goal: Understands least restrictive measures  Outcome: Progressing  Goal: Identifies stressors that lead to harmful behaviors  Outcome: Progressing  Goal: Notifies staff when experiencing harmful thoughts toward self/others  Outcome: Progressing  Goal: Free from restraint events  Outcome: Progressing     Problem: Educational/Scholastic Disruption  Goal: Meets educational requirements during hospitalization  Outcome: Progressing  Goal: Attends class without disruptive behavior  Outcome:  Progressing  Goal: Completes daily assignments  Outcome: Progressing     Problem: Ineffective Coping  Goal: Cooperates with admission process  Outcome: Progressing  Goal: Identifies ineffective coping skills  Outcome: Progressing  Goal: Identifies healthy coping skills  Outcome: Progressing  Goal: Demonstrates healthy coping skills  Outcome: Progressing  Goal: Participates in unit activities  Outcome: Progressing  Goal: Patient/Family participate in treatment and discharge plans  Outcome: Progressing  Goal: Patient/Family verbalizes awareness of resources  Outcome: Progressing  Goal: Understands least restrictive measures  Outcome: Progressing  Goal: Free from restraint events  Outcome: Progressing     Problem: Alteration in Sleep  Goal: STG - Reports nightly sleep, duration, and quality  Outcome: Progressing  Goal: STG - Identifies sleep hygiene aids  Outcome: Progressing  Goal: STG - Informs staff if unable to sleep  Outcome: Progressing  Goal: STG - Attends breathing and relaxation group  Outcome: Progressing     Problem: Potential for Substance Withdrawal  Goal: Verbalizes signs/symptoms of withdrawal  Outcome: Progressing  Goal: Reports signs/symptoms of withdrawal  Outcome: Progressing  Goal: Free of withdrawal symptoms  Outcome: Progressing     Problem: Anxiety  Goal: Patient/family understands admission protocols  Outcome: Progressing  Goal: Attempts to manage anxiety with help  Outcome: Progressing  Goal: Verbalizes ways to manage anxiety  Outcome: Progressing  Goal: Free from restraint events  Outcome: Progressing     Problem: Self Care Deficit  Goal: STG - Patient completes hygiene  Outcome: Progressing  Goal: Increase group attendance  Outcome: Progressing  Goal: STG - Goes to and eats meals independently in dining room 100% of time  Outcome: Progressing     Problem: Defensive Coping  Goal: Cooperates with admission process  Outcome: Progressing  Goal: Identifies reckless/dangerous behavior  Outcome:  Progressing  Goal: Identifies stressors that lead to reckless/dangerous behavior  Outcome: Progressing  Goal: Discusses and identifies healthy coping skills  Outcome: Progressing  Goal: Demonstrates healthy coping skills  Outcome: Progressing  Goal: Identifies appropriate social interaction  Outcome: Progressing  Goal: Demonstrates appropriate social interactions  Outcome: Progressing  Goal: Patient/Family verbalizes awareness of resources  Outcome: Progressing  Goal: Discusses signs/symptoms of illness/treatment options  Outcome: Progressing  Goal: Patient/Family participate in treatment and discharge plans  Outcome: Progressing  Goal: Understands least restrictive measures  Outcome: Progressing  Goal: Free from restraint events  Outcome: Progressing   The patient's goals for the shift include The doctor says I get to leave on Monday.  Can I get some nose spray so I can go to sleep?    The clinical goals for the shift include Pt will sleep > 6 hours tonight.    Patient talked about hard decisions that he has to make regarding staying a few days longer at [facility] then being discharged to sober living or being discharged and doing outpatient. Pt has been talkative about his desire to stay sober but worried that if he does use again he would probably die. Pt has full time job and says he does pay his bills first then usually party's with the remainder. Staying with his  friend but has not been upfront to them about his drug use. Voices desire to be honest to the people that cares about him. Frequent requests for reassurance r/t his behaviors and attitude now vs when he first was admitted.

## 2024-02-12 NOTE — DISCHARGE INSTR - APPOINTMENTS
[facility]  02/14/2024 @[address] [place] [id number]  [phone]  Please bring Insurance Card, Photo ID and Hospital Discharge Paperwork

## 2024-02-12 NOTE — CARE PLAN
Problem: Sensory Perceptual Alteration as Evidenced by  Goal: Patient/Family verbalizes awareness of resources  Outcome: Met     Problem: Sensory Perceptual Alteration as Evidenced by  Goal: Discusses signs/symptoms of illness/treatment options  Outcome: Met     Problem: Altered Thought Processes as Evidenced by  Goal: STG - Desires improvement in ability to think and concentrate  Outcome: Met     Problem: Potential for Harm to Self or Others  Goal: Identifies deescalation techniques  Outcome: Met     Problem: Potential for Substance Withdrawal  Goal: Reports signs/symptoms of withdrawal  Outcome: Met     Problem: Potential for Substance Withdrawal  Goal: Free of withdrawal symptoms  Outcome: Met     Problem: Anxiety  Goal: Verbalizes ways to manage anxiety  Outcome: Met   The patient's goals for the shift include discharge    The clinical goals for the shift include discuss coping skills and support    Pt was calm and cooperative this morning, talking about possible discharge. Pt states that he has been to NA in past, that it is hard to get to by his house, that he will go to follow up at his mental health provider for addiction services and may do online/virtual NA. Pt states that he has attended AA in the past but felt that he was not as welcome so that he will stick to NA meetings. Pt talked about support system stating that it is mostly his sister bu that he knows to go to his outpt provider if he needs to and that is what he had done prior to coming inpt. Pt states that his coping skills are listening to music and talking to sister. Pt denies any suicidal thoughts, denies any homicidal thoughts, denies any auditory or visual hallucinations. Pt is positive and forward thinking. Pt given discharge instructions on meds and follow up. Pt verbalized compliance and understanding. Pt gathered and signed for all belongings. Pt discharged to home via uber.

## 2024-02-12 NOTE — PROGRESS NOTES
"Occupational Therapy    [facility] Occupational Therapy Assessment & Treatment    Patient Name: Renan Zhu  MRN: [id number]  Today's Date: 2/12/2024      Activity:  \"Untangling Your Thinking\" Group    Attendance:  Attendance  Activity: Discussion/reminisce  Participation: Active participation    Treatment Approach  Approach : Group therapy sessions, 30 to 45 minutes  Patient Stated Goals: none stated  Cognition: Attention, Directions (Pt easily distractable, having side convos while others are talking, but is redirectable. Follows complex directions w/ min. additional verbal cueing.)  Social Skills: Interacts independently in social activity, Decreased attention to Group conversation - poor listening skills  Emotional: Mood (Pt mood euthymic, affect animated & appropriate. Pt looking forward to dischrage today.)  Stress Management/Relaxation Training: Other (Comment) (n/a this session)  Treatment Approach Comments: Pt attended & participated in therapy group focused on cognitive distortions & challenging negative thoughts. Pt given educational handouts describing steps to “Untangle Your Thinking”, including definitions of different cognitive distortions & various problem-solving strategies for challenging them. Reviewed & discussed. Pt participated in discussion by giving real-life examples of different cognitive distortions & their impact on daily functioning. Then, pt presented w/ different example scenarios of negative thinking. Group asked to identify cognitive distortions present in these scenarios & to brainstorm alternate ways of thinking. Pt participated independently & demonstrated fair understanding, though sometimes needing additional verbal cueing for accuracy. Finally, pt participated in discussion of using these strategies to “Untangle Thinking” in the context of everyday activities & ways that negative thinking can impact functioning in meaningful roles & occupations. Pt connected cognitive " distortions to the way he thinks about his work. Pt demonstrated fair understanding & motivation to apply skills.      Encounter Problems       Encounter Problems (Active)       OT Goals       Communication/Interaction Skills (Progressing)       Start:  02/10/24    Expected End:  03/09/24       Communication/Interaction Skills (Self-expression/social Behavior/assertive)    Explore/demonstrate 1-2 effective methods of expressing feelings/wants/needs (can include assertion/engaging/sharing/asking) prior to discharge          Coping Skills (Progressing)       Start:  02/10/24    Expected End:  03/09/24       Coping Skills      Explore/identify 1-2 effective strategies of expressing feelings, wants, needs(can include assertion, engaging, sharing, asking) prior to discharge          Emotion Regulation/Self Control (Progressing)       Start:  02/10/24    Expected End:  03/09/24        Emotion Regulation/self control      Pt will exhibit appropriate range, regulation of emotions, impulse control, frustration tolerance in OT groups prior to discharge.                  Education:  Pt given educational handouts on “Untangling Thinking”/challenging negative thoughts. Reviewed, discussed, & practiced. Pt demonstrates fair understanding.

## 2024-02-12 NOTE — NURSING NOTE
Patient talked about hard decisions that he has to make regarding staying a few days longer at [facility] then being discharged to sober living or being discharged and doing outpatient. Pt has been talkative about his desire to stay sober but worried that if he does use again he would probably die. Pt has full time job and says he does pay his bills first then usually party's with the remainder. Staying with his  friend but has not been upfront to them about his drug use. Voices desire to be honest to the people that cares about him. Frequent requests for reassurance r/t his behaviors and attitude now vs when he first was admitted. Pt denies SI/HI/AVH, Safety maintained.

## 2024-02-22 LAB
ATRIAL RATE: 61 BPM
P AXIS: 62 DEGREES
P OFFSET: 195 MS
P ONSET: 151 MS
PR INTERVAL: 144 MS
Q ONSET: 223 MS
QRS COUNT: 10 BEATS
QRS DURATION: 102 MS
QT INTERVAL: 426 MS
QTC CALCULATION(BAZETT): 428 MS
QTC FREDERICIA: 428 MS
R AXIS: 75 DEGREES
T AXIS: 54 DEGREES
T OFFSET: 436 MS
VENTRICULAR RATE: 61 BPM

## 2024-06-07 ENCOUNTER — APPOINTMENT (OUTPATIENT)
Dept: CARDIOLOGY | Facility: HOSPITAL | Age: 29
End: 2024-06-07
Payer: MEDICAID

## 2024-06-07 ENCOUNTER — HOSPITAL ENCOUNTER (EMERGENCY)
Facility: HOSPITAL | Age: 29
Discharge: OTHER INSTITUTION | End: 2024-06-07
Attending: EMERGENCY MEDICINE
Payer: MEDICAID

## 2024-06-07 VITALS
BODY MASS INDEX: 20.89 KG/M2 | WEIGHT: 130 LBS | HEIGHT: 66 IN | DIASTOLIC BLOOD PRESSURE: 73 MMHG | SYSTOLIC BLOOD PRESSURE: 110 MMHG | OXYGEN SATURATION: 99 % | RESPIRATION RATE: 16 BRPM | HEART RATE: 55 BPM | TEMPERATURE: 96.6 F

## 2024-06-07 DIAGNOSIS — F11.10 OPIOID ABUSE (MULTI): Primary | ICD-10-CM

## 2024-06-07 LAB
ALBUMIN SERPL-MCNC: 4.6 G/DL (ref 3.5–5)
ALP BLD-CCNC: 71 U/L (ref 35–125)
ALT SERPL-CCNC: 20 U/L (ref 5–40)
AMPHETAMINES UR QL SCN>1000 NG/ML: NEGATIVE
ANION GAP SERPL CALC-SCNC: 10 MMOL/L
APPEARANCE UR: CLEAR
AST SERPL-CCNC: 16 U/L (ref 5–40)
BARBITURATES UR QL SCN>300 NG/ML: NEGATIVE
BASOPHILS # BLD AUTO: 0.03 X10*3/UL (ref 0–0.1)
BASOPHILS NFR BLD AUTO: 0.7 %
BENZODIAZ UR QL SCN>300 NG/ML: NEGATIVE
BILIRUB SERPL-MCNC: 0.4 MG/DL (ref 0.1–1.2)
BILIRUB UR STRIP.AUTO-MCNC: NEGATIVE MG/DL
BUN SERPL-MCNC: 11 MG/DL (ref 8–25)
BZE UR QL SCN>300 NG/ML: POSITIVE
CALCIUM SERPL-MCNC: 9.2 MG/DL (ref 8.5–10.4)
CANNABINOIDS UR QL SCN>50 NG/ML: POSITIVE
CHLORIDE SERPL-SCNC: 105 MMOL/L (ref 97–107)
CO2 SERPL-SCNC: 24 MMOL/L (ref 24–31)
COLOR UR: YELLOW
CREAT SERPL-MCNC: 1.2 MG/DL (ref 0.4–1.6)
EGFRCR SERPLBLD CKD-EPI 2021: 85 ML/MIN/1.73M*2
EOSINOPHIL # BLD AUTO: 0.33 X10*3/UL (ref 0–0.7)
EOSINOPHIL NFR BLD AUTO: 7.4 %
ERYTHROCYTE [DISTWIDTH] IN BLOOD BY AUTOMATED COUNT: 12.2 % (ref 11.5–14.5)
FENTANYL+NORFENTANYL UR QL SCN: NEGATIVE
GLUCOSE SERPL-MCNC: 94 MG/DL (ref 65–99)
GLUCOSE UR STRIP.AUTO-MCNC: NORMAL MG/DL
HCT VFR BLD AUTO: 43.7 % (ref 41–52)
HGB BLD-MCNC: 15.5 G/DL (ref 13.5–17.5)
IMM GRANULOCYTES # BLD AUTO: 0.01 X10*3/UL (ref 0–0.7)
IMM GRANULOCYTES NFR BLD AUTO: 0.2 % (ref 0–0.9)
KETONES UR STRIP.AUTO-MCNC: NEGATIVE MG/DL
LEUKOCYTE ESTERASE UR QL STRIP.AUTO: NEGATIVE
LYMPHOCYTES # BLD AUTO: 1.29 X10*3/UL (ref 1.2–4.8)
LYMPHOCYTES NFR BLD AUTO: 28.9 %
MCH RBC QN AUTO: 30.5 PG (ref 26–34)
MCHC RBC AUTO-ENTMCNC: 35.5 G/DL (ref 32–36)
MCV RBC AUTO: 86 FL (ref 80–100)
METHADONE UR QL SCN>300 NG/ML: NEGATIVE
MONOCYTES # BLD AUTO: 0.33 X10*3/UL (ref 0.1–1)
MONOCYTES NFR BLD AUTO: 7.4 %
MUCOUS THREADS #/AREA URNS AUTO: NORMAL /LPF
NEUTROPHILS # BLD AUTO: 2.47 X10*3/UL (ref 1.2–7.7)
NEUTROPHILS NFR BLD AUTO: 55.4 %
NITRITE UR QL STRIP.AUTO: NEGATIVE
NRBC BLD-RTO: 0 /100 WBCS (ref 0–0)
OPIATES UR QL SCN>300 NG/ML: NEGATIVE
OXYCODONE UR QL: NEGATIVE
PCP UR QL SCN>25 NG/ML: NEGATIVE
PH UR STRIP.AUTO: 5.5 [PH]
PLATELET # BLD AUTO: 250 X10*3/UL (ref 150–450)
POTASSIUM SERPL-SCNC: 3.9 MMOL/L (ref 3.4–5.1)
PROT SERPL-MCNC: 7.2 G/DL (ref 5.9–7.9)
PROT UR STRIP.AUTO-MCNC: ABNORMAL MG/DL
RBC # BLD AUTO: 5.08 X10*6/UL (ref 4.5–5.9)
RBC # UR STRIP.AUTO: NEGATIVE /UL
RBC #/AREA URNS AUTO: NORMAL /HPF
SARS-COV-2 RNA RESP QL NAA+PROBE: NOT DETECTED
SODIUM SERPL-SCNC: 139 MMOL/L (ref 133–145)
SP GR UR STRIP.AUTO: 1.04
UROBILINOGEN UR STRIP.AUTO-MCNC: NORMAL MG/DL
WBC # BLD AUTO: 4.5 X10*3/UL (ref 4.4–11.3)
WBC #/AREA URNS AUTO: NORMAL /HPF

## 2024-06-07 PROCEDURE — 87635 SARS-COV-2 COVID-19 AMP PRB: CPT | Performed by: EMERGENCY MEDICINE

## 2024-06-07 PROCEDURE — 36415 COLL VENOUS BLD VENIPUNCTURE: CPT | Performed by: EMERGENCY MEDICINE

## 2024-06-07 PROCEDURE — 81001 URINALYSIS AUTO W/SCOPE: CPT | Mod: 59 | Performed by: EMERGENCY MEDICINE

## 2024-06-07 PROCEDURE — 90839 PSYTX CRISIS INITIAL 60 MIN: CPT

## 2024-06-07 PROCEDURE — 99285 EMERGENCY DEPT VISIT HI MDM: CPT | Mod: 25

## 2024-06-07 PROCEDURE — 2500000001 HC RX 250 WO HCPCS SELF ADMINISTERED DRUGS (ALT 637 FOR MEDICARE OP)

## 2024-06-07 PROCEDURE — 93005 ELECTROCARDIOGRAM TRACING: CPT

## 2024-06-07 PROCEDURE — 82310 ASSAY OF CALCIUM: CPT | Performed by: EMERGENCY MEDICINE

## 2024-06-07 PROCEDURE — 80307 DRUG TEST PRSMV CHEM ANLYZR: CPT | Performed by: EMERGENCY MEDICINE

## 2024-06-07 PROCEDURE — 85025 COMPLETE CBC W/AUTO DIFF WBC: CPT | Performed by: EMERGENCY MEDICINE

## 2024-06-07 RX ORDER — LORAZEPAM 1 MG/1
1 TABLET ORAL ONCE
Status: COMPLETED | OUTPATIENT
Start: 2024-06-07 | End: 2024-06-07

## 2024-06-07 RX ADMIN — LORAZEPAM 1 MG: 1 TABLET ORAL at 18:20

## 2024-06-07 SDOH — ECONOMIC STABILITY: HOUSING INSECURITY: FEELS SAFE LIVING IN HOME: YES

## 2024-06-07 SDOH — HEALTH STABILITY: MENTAL HEALTH: ACTIVE SUICIDAL IDEATION WITH SPECIFIC PLAN AND INTENT (PAST 1 MONTH): NO

## 2024-06-07 SDOH — HEALTH STABILITY: MENTAL HEALTH: SUICIDAL BEHAVIOR (3 MONTHS): YES

## 2024-06-07 SDOH — HEALTH STABILITY: MENTAL HEALTH: IN THE PAST WEEK, HAVE YOU BEEN HAVING THOUGHTS ABOUT KILLING YOURSELF?: NO

## 2024-06-07 SDOH — HEALTH STABILITY: MENTAL HEALTH: DESCRIBE YOUR THOUGHTS OF KILLING YOURSELF RIGHT NOW:: "I WANT THE PAIN TO STOP"

## 2024-06-07 SDOH — HEALTH STABILITY: MENTAL HEALTH: WISH TO BE DEAD (PAST 1 MONTH): YES

## 2024-06-07 SDOH — HEALTH STABILITY: MENTAL HEALTH: NON-SPECIFIC ACTIVE SUICIDAL THOUGHTS (PAST 1 MONTH): YES

## 2024-06-07 SDOH — HEALTH STABILITY: MENTAL HEALTH: ANXIETY SYMPTOMS: NO PROBLEMS REPORTED OR OBSERVED.

## 2024-06-07 SDOH — HEALTH STABILITY: MENTAL HEALTH: SUICIDAL BEHAVIOR (LIFETIME): YES

## 2024-06-07 SDOH — HEALTH STABILITY: MENTAL HEALTH: HOW DID YOU TRY TO KILL YOURSELF?: HANGING, CHOKING

## 2024-06-07 SDOH — HEALTH STABILITY: MENTAL HEALTH

## 2024-06-07 SDOH — HEALTH STABILITY: MENTAL HEALTH: ACTIVE SUICIDAL IDEATION WITH SOME INTENT TO ACT, WITHOUT SPECIFIC PLAN (PAST 1 MONTH): NO

## 2024-06-07 SDOH — HEALTH STABILITY: MENTAL HEALTH: IN THE PAST FEW WEEKS, HAVE YOU FELT THAT YOU OR YOUR FAMILY WOULD BE BETTER OFF IF YOU WERE DEAD?: YES

## 2024-06-07 SDOH — HEALTH STABILITY: MENTAL HEALTH: HAVE YOU EVER TRIED TO KILL YOURSELF?: YES

## 2024-06-07 SDOH — HEALTH STABILITY: MENTAL HEALTH: DEPRESSION SYMPTOMS: FEELINGS OF HELPLESSNESS;FEELINGS OF HOPELESSESS;LOSS OF INTEREST;SLEEP DISTURBANCE

## 2024-06-07 SDOH — HEALTH STABILITY: MENTAL HEALTH: IN THE PAST FEW WEEKS, HAVE YOU WISHED YOU WERE DEAD?: YES

## 2024-06-07 SDOH — HEALTH STABILITY: MENTAL HEALTH: ARE YOU HAVING THOUGHTS OF KILLING YOURSELF RIGHT NOW?: YES

## 2024-06-07 SDOH — ECONOMIC STABILITY: GENERAL

## 2024-06-07 ASSESSMENT — COLUMBIA-SUICIDE SEVERITY RATING SCALE - C-SSRS
2. HAVE YOU ACTUALLY HAD ANY THOUGHTS OF KILLING YOURSELF?: YES
6. HAVE YOU EVER DONE ANYTHING, STARTED TO DO ANYTHING, OR PREPARED TO DO ANYTHING TO END YOUR LIFE?: NO
2. HAVE YOU ACTUALLY HAD ANY THOUGHTS OF KILLING YOURSELF?: NO
4. HAVE YOU HAD THESE THOUGHTS AND HAD SOME INTENTION OF ACTING ON THEM?: NO
6. HAVE YOU EVER DONE ANYTHING, STARTED TO DO ANYTHING, OR PREPARED TO DO ANYTHING TO END YOUR LIFE?: YES
5. HAVE YOU STARTED TO WORK OUT OR WORKED OUT THE DETAILS OF HOW TO KILL YOURSELF? DO YOU INTEND TO CARRY OUT THIS PLAN?: NO
1. IN THE PAST MONTH, HAVE YOU WISHED YOU WERE DEAD OR WISHED YOU COULD GO TO SLEEP AND NOT WAKE UP?: YES
1. SINCE LAST CONTACT, HAVE YOU WISHED YOU WERE DEAD OR WISHED YOU COULD GO TO SLEEP AND NOT WAKE UP?: NO
6. HAVE YOU EVER DONE ANYTHING, STARTED TO DO ANYTHING, OR PREPARED TO DO ANYTHING TO END YOUR LIFE?: NO

## 2024-06-07 ASSESSMENT — LIFESTYLE VARIABLES
HAVE YOU EVER FELT YOU SHOULD CUT DOWN ON YOUR DRINKING: NO
EVER HAD A DRINK FIRST THING IN THE MORNING TO STEADY YOUR NERVES TO GET RID OF A HANGOVER: NO
PRESCIPTION_ABUSE_PAST_12_MONTHS: YES
HAVE PEOPLE ANNOYED YOU BY CRITICIZING YOUR DRINKING: NO
EVER FELT BAD OR GUILTY ABOUT YOUR DRINKING: NO
TOTAL SCORE: 0
SUBSTANCE_ABUSE_PAST_12_MONTHS: YES

## 2024-06-07 ASSESSMENT — PAIN SCALES - GENERAL: PAINLEVEL_OUTOF10: 0 - NO PAIN

## 2024-06-07 ASSESSMENT — PAIN - FUNCTIONAL ASSESSMENT: PAIN_FUNCTIONAL_ASSESSMENT: 0-10

## 2024-06-07 NOTE — PROGRESS NOTES
Patient accepted to [facility] [address] unit by Dr. Andino. Nursing report number [phone].

## 2024-06-07 NOTE — ED PROVIDER NOTES
"HPI   Chief Complaint   Patient presents with    Drug / Alcohol Assessment    Suicidal     Pt states he went to [facility] for SI, was sent here for clearance. Pt states last used percocet 2 days ago and cocaine last night.        HPI  Patient is a 28-year-old male with history of substance abuse presenting for evaluation of suicidal ideation and detox.  Patient states that he has been using Percocet every day for several and also doing cocaine several today.  States he last used Percocet 2 days ago and last used cocaine at approximately 3 in the morning.  He states that he is in a bad place mentally and has thoughts of wanting to die though he states he \"does not have the balls to go through with it.\"  He states he did attempt to hang himself 2 months ago.  No thoughts of hurting others. He states he just wants to get better and for the pain and.  He states he called [facility] this morning to get help and they told him to come the ER to be admitted.                  [place] Coma Scale Score: 15                     Patient History   No past medical history on file.  No past surgical history on file.  No family history on file.  Social History     Tobacco Use    Smoking status: Every Day     Types: Cigarettes    Smokeless tobacco: Current   Vaping Use    Vaping status: Every Day   Substance Use Topics    Alcohol use: Not on file    Drug use: Not on file       Physical Exam   ED Triage Vitals [06/07/24 1400]   Temperature Heart Rate Respirations BP   35.9 °C (96.6 °F) 75 16 126/89      Pulse Ox Temp Source Heart Rate Source Patient Position   98 % Temporal -- Sitting      BP Location FiO2 (%)     Left arm --       Physical Exam  Vitals and nursing note reviewed.   Constitutional:       General: He is not in acute distress.     Appearance: He is well-developed.      Comments: Resting in hospital bed in no acute distress   HENT:      Head: Normocephalic and atraumatic.   Eyes:      Conjunctiva/sclera: Conjunctivae " normal.   Cardiovascular:      Rate and Rhythm: Normal rate and regular rhythm.      Heart sounds: No murmur heard.  Pulmonary:      Effort: Pulmonary effort is normal. No respiratory distress.      Breath sounds: Normal breath sounds.   Abdominal:      Palpations: Abdomen is soft.      Tenderness: There is no abdominal tenderness.   Musculoskeletal:         General: No swelling.      Cervical back: Neck supple.   Skin:     General: Skin is warm and dry.      Capillary Refill: Capillary refill takes less than 2 seconds.   Neurological:      Mental Status: He is alert.   Psychiatric:         Mood and Affect: Mood normal.         ED Course & MDM   ED Course as of 06/07/24 1657   Fri Jun 07, 2024   1612 EKG personally interpreted by me performed at 1404  Normal sinus rhythm with a ventricular rate 75 normal axis and intervals no acute ischemic changes [EF]      ED Course User Index  [EF] Maria Guadalupe Gauthier, DO       Medical Decision Making  Parts of this chart have been completed using voice recognition software. Please excuse any errors of transcription.  My thought process and reason for plan has been formulated from the time that I saw the patient until the time of disposition and is not specific to one specific moment during their visit and furthermore my MDM encompasses this entire chart and not only this text box.      HPI: Detailed above.    Exam: A medically appropriate exam performed, outlined above, given the known history and presentation.    History obtained from: Patient    EKG: Interpreted by attending physician and reviewed by me    Social Determinants of Health considered during this visit: Lives independently    Medications given during visit:  Medications - No data to display     Diagnostic/tests  Labs Reviewed   DRUG SCREEN,URINE - Abnormal       Result Value    Amphetamine Screen, Urine Negative      Barbiturate Screen, Urine Negative      Benzodiazepines Screen, Urine Negative      Cannabinoid Screen,  Urine Positive (*)     Cocaine Metabolite Screen, Urine Positive (*)     Fentanyl Screen, Urine Negative      Methadone Screen, Urine Negative      Opiate Screen, Urine Negative      Oxycodone Screen, Urine Negative      PCP Screen, Urine Negative      Narrative:     These toxicological screening tests provide unconfirmed qualitative measurements to aid in treatment and diagnosis in cases of drug use or overdose. This test is used only for medical purposes. A positive result does not indicate or measure intoxication. For specific test performance or pathologist consultation, please contact the Laboratory.    The following threshold concentrations are used for these analyses.Values at or above the threshold concentration are reported as positive. Values below the threshold are reported as negative.    Drug /Screening Threshold                                                                                                 THC/CANNABINOIDS................50 ng/ml  METHADONE......................300 ng/ml  COCAINE METABOLITES............300 ng/ml  BENZODIAZEPINE.................300 ng/ml  PCP.............................25 ng/ml  OPIATE.........................300 ng/ml  AMPHETAMINE/ECSTASY...........1000 ng/ml  BARBITURATE....................200 ng/ml  OXYCODONE......................100 ng/ml  FENTANYL.........................5 ng/ml       URINALYSIS WITH REFLEX CULTURE AND MICROSCOPIC - Abnormal    Color, Urine Yellow      Appearance, Urine Clear      Specific Gravity, Urine 1.036 (*)     pH, Urine 5.5      Protein, Urine 10 (TRACE)      Glucose, Urine Normal      Blood, Urine NEGATIVE      Ketones, Urine NEGATIVE      Bilirubin, Urine NEGATIVE      Urobilinogen, Urine Normal      Nitrite, Urine NEGATIVE      Leukocyte Esterase, Urine NEGATIVE     COMPREHENSIVE METABOLIC PANEL - Normal    Glucose 94      Sodium 139      Potassium 3.9      Chloride 105      Bicarbonate 24      Urea Nitrogen 11      Creatinine 1.20       eGFR 85      Calcium 9.2      Albumin 4.6      Alkaline Phosphatase 71      Total Protein 7.2      AST 16      Bilirubin, Total 0.4      ALT 20      Anion Gap 10     SARS-COV-2 PCR - Normal    Coronavirus 2019, PCR Not Detected      Narrative:     This assay has received FDA Emergency Use Authorization (EUA) and is only authorized for the duration of time that circumstances exist to justify the authorization of the emergency use of in vitro diagnostic tests for the detection of SARS-CoV-2 virus and/or diagnosis of COVID-19 infection under section 564(b)(1) of the Act, 21 U.S.C. 360bbb-3(b)(1). This assay is an in vitro diagnostic nucleic acid amplification test for the qualitative detection of SARS-CoV-2 from nasopharyngeal specimens and has been validated for use at [facility]. Negative results do not preclude COVID-19 infections and should not be used as the sole basis for diagnosis, treatment, or other management decisions.     CBC WITH AUTO DIFFERENTIAL    WBC 4.5      nRBC 0.0      RBC 5.08      Hemoglobin 15.5      Hematocrit 43.7      MCV 86      MCH 30.5      MCHC 35.5      RDW 12.2      Platelets 250      Neutrophils % 55.4      Immature Granulocytes %, Automated 0.2      Lymphocytes % 28.9      Monocytes % 7.4      Eosinophils % 7.4      Basophils % 0.7      Neutrophils Absolute 2.47      Immature Granulocytes Absolute, Automated 0.01      Lymphocytes Absolute 1.29      Monocytes Absolute 0.33      Eosinophils Absolute 0.33      Basophils Absolute 0.03     URINALYSIS WITH REFLEX CULTURE AND MICROSCOPIC    Narrative:     The following orders were created for panel order Urinalysis with Reflex Culture and Microscopic.  Procedure                               Abnormality         Status                     ---------                               -----------         ------                     Urinalysis with Reflex C...[[id number]]  Abnormal            Final result               Extra  Urine Gray Tube[[id number]]                            In process                   Please view results for these tests on the individual orders.   EXTRA URINE GRAY TUBE   URINALYSIS MICROSCOPIC WITH REFLEX CULTURE    WBC, Urine NONE      RBC, Urine NONE      Mucus, Urine 3+        No orders to display        Considerations/further MDM:  28-year-old male presenting for evaluation of detox and suicidal ideation.  During the ER visit the patient is awake and alert in no acute distress.  Vital signs are within normal limits during the visit.  No obvious signs of withdrawal during the visit.  Patient is suicidal and requesting detox and inpatient therapy.  Laboratory workup was obtained and EKG was performed without acute ischemia.  Laboratory workup unremarkable without leukocytosis, electrolyte abnormality or acute kidney injury.  Urine drug screen positive for cocaine and cannabis.  Do believe patient requires inpatient psychiatric hospitalization at this time.  EPAT consult placed and pending placement.  Patient was medically cleared for inpatient psychiatric treatment.  He remained well-appearing and stable during the ER visit.  He was medically cleared for placement at inpatient psychiatric facility.  I saw this patient in conjunction with Dr. Gauthier.  Please refer to her note for additional details regarding patient case.      Procedure  Procedures     Laura Mejia PA-C  06/07/24 1700       Laura Mejia PA-C  06/07/24 1815

## 2024-06-07 NOTE — ED NOTES
Patient belongings placed in LOCK[facility] 2  Shoes  Socks  Pants  Shirt  Phone     Mercedes Cortes, EMT  06/07/24 3854

## 2024-06-07 NOTE — PROGRESS NOTES
"EPAT - Social Work Psychiatric Assessment    Arrival Details  Mode of Arrival: Ambulatory  Admission Source: Home  Admission Type: Involuntary, Voluntary (presented voluntarily, pink slip by )  EPAT Assessment Start Date: 06/07/24  EPAT Assessment Start Time: 1534  Name of : LUCIE Jimenes    History of Present Illness  Admission Reason: Pt is a 29y/o male presenting to [facility] ED for SI.  HPI: Pt chart, triage and provider notes reviewed prior to assessment, triage assessment reflects \"moderate risk\". Pt reports SI all day, every day for several weeks related to life stressors and substance abuse. Pt reports he has been using percocet and cocaine daily for years. Pt denies plan to harm himself but had a recent attempt two months ago. Pt denies current engagement in mental health treatment but has a history of depression and ADHD.     SW Readmission Information   Readmission within 30 Days: No    Psychiatric Symptoms  Anxiety Symptoms: No problems reported or observed.  Depression Symptoms: Feelings of helplessness, Feelings of hopelessess, Loss of interest, Sleep disturbance  Tabitha Symptoms: No problems reported or observed.    Psychosis Symptoms  Hallucination Type: No problems reported or observed.  Delusion Type: No problems reported or observed.    Additional Symptoms - Adult  Generalized Anxiety Disorder: No problems reported or observed.  Obsessive Compulsive Disorder: No problems reported or observed.  Panic Attack: No problems reported or observed.  Post Traumatic Stress Disorder: No problems reported or observed.  Delirium: No problems reported or observed.    Past Psychiatric History/Meds/Treatments  Past Psychiatric History: Pt reports history of ADHD, depression, cocaine abuse and percocet abuse.  Past Psychiatric Meds/Treatments: Pt denies current engagement w/ any MH providers but has history of multiple inpt admissions.  Past Violence/Victimization History: Denies    Current Mental Health " Contacts   Name/Phone Number: N/A  Provider Name/Phone Number: N/A    Support System: Friends    Living Arrangement: Lives with someone (lives w/ friends)    Home Safety  Feels Safe Living in Home: Yes  Potentially Unsafe Housing Conditions: Unable to Assess  Home Safety : No concerns reported.    Income Information  Employment Status for: Patient  Employment Status: Employed  Current/Previous Occupation:  ("[employer]"ing)  Income/Expense Information: Income meets expenses  Financial Concerns: None              Legal  Legal Considerations: Patient/ Family Ability to Make Healthcare Decisions  Criminal Activity/ Legal Involvement Pertinent to Current Situation/ Hospitalization: None reported  Legal Concerns: None reported    Drug Screening  Have you used any substances (canabis, cocaine, heroin, hallucinogens, inhalants, etc.) in the past 12 months?: Yes  Have you used any prescription drugs other than prescribed in the past 12 months?: Yes (percocet)  Is a toxicology screen needed?: Yes (tox positive for THC and cocaine)    Stage of Change  Stage of Change: Preparation  Duration of Substance Use: first use at age 15  Frequency of Substance Use: daily  Age of First Substance Use: 15    Psychosocial  Psychosocial (WDL): Within Defined Limits    Orientation  Orientation Level: Oriented X4, Appropriate for developmental age    General Appearance  Motor Activity: Unremarkable  Speech Pattern: Other (Comment) (unremarkable)  General Attitude: Attentive, Apathetic, Cooperative, Pleasant  Appearance/Hygiene: Unremarkable    Thought Process  Coherency: [place] thinking  Content: Unremarkable  Delusions: Other (Comment) (none reported)  Perception: Not altered  Hallucination: None  Judgment/Insight: Limited  Confusion: None  Cognition: Appropriate judgement, Appropriate safety awareness, Appropriate attention/concentration, Appropriate for developmental age    Sleep Pattern  Sleep Pattern: Restlessness, Difficulty  "falling asleep, Disturbed/interrupted sleep    Risk Factors  Self Harm/Suicidal Ideation Plan: Pt reports SI daily for a long period of time. Denies current plan because he is afraid, however, admits to an attempt in the last couple months.  Previous Self Harm/Suicidal Plans: Pt reports history of multiple attempts including hanging and choking himself.  Risk Factors: Male, Substance abuse, Past history of violence  Description of Thoughts/Ideas Leaving Unit Now: Endorses current SI.    Violence Risk Assessment  Assessment of Violence: None noted  Thoughts of Harm to Others: No    Ability to Assess Risk Screen  Risk Screen - Ability to Assess: Able to be screened  Ask Suicide-Screening Questions  1. In the past few weeks, have you wished you were dead?: Yes  2. In the past few weeks, have you felt that you or your family would be better off if you were dead?: Yes  3. In the past week, have you been having thoughts about killing yourself?: No  4. Have you ever tried to kill yourself?: Yes  How did you try to kill yourself?: hanging, choking  When did you try to kill yourself?: last attempt two months ago  5. Are you having thoughts of killing yourself right now?: Yes  Describe your thoughts of killing yourself right now:: \"I want the pain to stop\"  Calculated Risk Score: Imminent Risk  [place] Suicide Severity Rating Scale (Screener/Recent Self-Report)  1. Wish to be Dead (Past 1 Month): Yes  2. Non-Specific Active Suicidal Thoughts (Past 1 Month): Yes  3. Active Suicidal Ideation with any Methods (Not Plan) Without Intent to Act (Past 1 Month): No  4. Active Suicidal Ideation with Some Intent to Act, Without Specific Plan (Past 1 Month): No  5. Active Suicidal Ideation with Specific Plan and Intent (Past 1 Month): No  6. Suicidal Behavior (Lifetime): Yes  6. Suicidal Behavior (3 Months): Yes  6. Suicidal Behavior (Description): attempt to hang self two months ago  Calculated C-SSRS Risk Score (Lifetime/Recent): " High Risk  Step 1: Risk Factors  Current & Past Psychiatric Dx: Mood disorder, ADHD, Alcohol/substance abuse disorders  Presenting Symptoms: Hopelessness or despair  Precipitants/Stressors: Triggering events leading to humiliation, shame, and/or despair (e.g. loss of relationship, financial or health status) (real or anticipated), Substance intoxication or withdrawal  Change in Treatment: Non-compliant or not receiving treatment  Access to Lethal Methods : No  Step 2: Protective Factors   Protective Factors Internal: Fear of death or the actual act of killing self  Protective Factors External: Engaged in work or school, Supportive social network or family or friends  Step 3: Suicidal Ideation Intensity  Most Severe Suicidal Ideation Identified: multiple previous attempts  How Many Times Have You Had These Thoughts: Many times each day  When You Have the Thoughts How Long do They Last : More than 8 hours/persistent or continuous  Could/Can You Stop Thinking About Killing Yourself or Wanting to Die if You Want to: Can control thoughts with some difficulty  Are There Things - Anyone or Anything - That Stopped You From Wanting to Die or Acting on: Deterrents most likely did not stop you  What Sort of Reasons Did You Have For Thinking About Wanting to Die or Killing Yourself: Completely to end or stop the pain (you couldn't go on living with the pain or how you were feeling)  Total Score: 22  Step 5: Documentation  Risk Level: Moderate suicide risk    Psychiatric Impression and Plan of Care  Assessment and Plan: Met [facility] w/ pt for assessment. Pt resting comfortably in bed w/ depressed mood and affect, calm and cooperative, A&Ox4. Pt reports SI all day, every day for several weeks. Pt denies a plan to harm himself at this time due to being afraid, however, admits to an attempt to hang himself two months ago. Pt indicates the rope broke. Pt shares history of multiple other attempts such as choking himself. Pt has also been  "admitted inpatient several times in the past, including [facility] and [facility]. Pt also reports using percocet and cocaine daily for years. Pt explains that he last used percocet three days ago, denies any withdrawal symptoms at this time. Pt reports he has been using \"a lot\" of cocaine w/ last use early this morning. Pt reports difficulty sleeping and eating. Pt shares feeling as though his head \"isn't right\" due to life stressors and substance abuse. Pt is not active w/ any mental health or chemical dependency treatment at this time but feels as though he needs to go back on medication. Pt would benefit from inpatient admission for safety and stabilization.  Specific Resources Provided to Patient: Peer support met w/ pt during visit.  CM Notified: N/A  PHP/IOP Recommended: N/A  Specific Information Provided for PHP/IOP: N/A  Plan Comments: Diagnostic impression: major depressive disorder    Outcome/Disposition  Patient's Perception of Outcome Achieved: in agreement  Assessment, Recommendations and Risk Level Reviewed with: DARRYL Mejia  Contact Name: N/A  Contact Number(s): N/A  EPAT Assessment Completed Date: 06/07/24  EPAT Assessment Completed Time: 1550  Patient Disposition:  Adult Inpatient Psych      "

## 2024-06-08 LAB — HOLD SPECIMEN: NORMAL

## 2024-06-13 LAB
ATRIAL RATE: 75 BPM
P AXIS: 73 DEGREES
P OFFSET: 197 MS
P ONSET: 149 MS
PR INTERVAL: 150 MS
Q ONSET: 224 MS
QRS COUNT: 13 BEATS
QRS DURATION: 94 MS
QT INTERVAL: 378 MS
QTC CALCULATION(BAZETT): 422 MS
QTC FREDERICIA: 406 MS
R AXIS: 72 DEGREES
T AXIS: 56 DEGREES
T OFFSET: 413 MS
VENTRICULAR RATE: 75 BPM

## 2024-06-23 ENCOUNTER — HOSPITAL ENCOUNTER (OUTPATIENT)
Dept: CARDIOLOGY | Facility: HOSPITAL | Age: 29
Discharge: HOME | End: 2024-06-23
Payer: MEDICAID

## 2024-06-23 ENCOUNTER — APPOINTMENT (OUTPATIENT)
Dept: RADIOLOGY | Facility: HOSPITAL | Age: 29
End: 2024-06-23
Payer: MEDICAID

## 2024-06-23 ENCOUNTER — HOSPITAL ENCOUNTER (EMERGENCY)
Facility: HOSPITAL | Age: 29
Discharge: HOME | End: 2024-06-23
Payer: MEDICAID

## 2024-06-23 VITALS
DIASTOLIC BLOOD PRESSURE: 81 MMHG | HEIGHT: 66 IN | HEART RATE: 72 BPM | RESPIRATION RATE: 18 BRPM | WEIGHT: 130 LBS | OXYGEN SATURATION: 98 % | BODY MASS INDEX: 20.89 KG/M2 | SYSTOLIC BLOOD PRESSURE: 126 MMHG | TEMPERATURE: 98.2 F

## 2024-06-23 DIAGNOSIS — R07.9 CHEST PAIN, UNSPECIFIED TYPE: Primary | ICD-10-CM

## 2024-06-23 LAB
ALBUMIN SERPL-MCNC: 4.2 G/DL (ref 3.5–5)
ALP BLD-CCNC: 78 U/L (ref 35–125)
ALT SERPL-CCNC: 45 U/L (ref 5–40)
ANION GAP SERPL CALC-SCNC: 10 MMOL/L
AST SERPL-CCNC: 23 U/L (ref 5–40)
BASOPHILS # BLD AUTO: 0.02 X10*3/UL (ref 0–0.1)
BASOPHILS NFR BLD AUTO: 0.5 %
BILIRUB SERPL-MCNC: <0.2 MG/DL (ref 0.1–1.2)
BUN SERPL-MCNC: 19 MG/DL (ref 8–25)
CALCIUM SERPL-MCNC: 8.9 MG/DL (ref 8.5–10.4)
CHLORIDE SERPL-SCNC: 103 MMOL/L (ref 97–107)
CO2 SERPL-SCNC: 23 MMOL/L (ref 24–31)
CREAT SERPL-MCNC: 1 MG/DL (ref 0.4–1.6)
EGFRCR SERPLBLD CKD-EPI 2021: >90 ML/MIN/1.73M*2
EOSINOPHIL # BLD AUTO: 0.16 X10*3/UL (ref 0–0.7)
EOSINOPHIL NFR BLD AUTO: 4.3 %
ERYTHROCYTE [DISTWIDTH] IN BLOOD BY AUTOMATED COUNT: 12.7 % (ref 11.5–14.5)
GLUCOSE SERPL-MCNC: 94 MG/DL (ref 65–99)
HCT VFR BLD AUTO: 40.5 % (ref 41–52)
HGB BLD-MCNC: 14 G/DL (ref 13.5–17.5)
IMM GRANULOCYTES # BLD AUTO: 0.01 X10*3/UL (ref 0–0.7)
IMM GRANULOCYTES NFR BLD AUTO: 0.3 % (ref 0–0.9)
LYMPHOCYTES # BLD AUTO: 1.32 X10*3/UL (ref 1.2–4.8)
LYMPHOCYTES NFR BLD AUTO: 35.4 %
MAGNESIUM SERPL-MCNC: 2 MG/DL (ref 1.6–3.1)
MCH RBC QN AUTO: 30.4 PG (ref 26–34)
MCHC RBC AUTO-ENTMCNC: 34.6 G/DL (ref 32–36)
MCV RBC AUTO: 88 FL (ref 80–100)
MONOCYTES # BLD AUTO: 0.28 X10*3/UL (ref 0.1–1)
MONOCYTES NFR BLD AUTO: 7.5 %
NEUTROPHILS # BLD AUTO: 1.94 X10*3/UL (ref 1.2–7.7)
NEUTROPHILS NFR BLD AUTO: 52 %
NRBC BLD-RTO: 0 /100 WBCS (ref 0–0)
PLATELET # BLD AUTO: 240 X10*3/UL (ref 150–450)
POTASSIUM SERPL-SCNC: 4 MMOL/L (ref 3.4–5.1)
PROT SERPL-MCNC: 6.2 G/DL (ref 5.9–7.9)
RBC # BLD AUTO: 4.6 X10*6/UL (ref 4.5–5.9)
SODIUM SERPL-SCNC: 136 MMOL/L (ref 133–145)
TROPONIN T SERPL-MCNC: <6 NG/L
WBC # BLD AUTO: 3.7 X10*3/UL (ref 4.4–11.3)

## 2024-06-23 PROCEDURE — 71045 X-RAY EXAM CHEST 1 VIEW: CPT

## 2024-06-23 PROCEDURE — 84484 ASSAY OF TROPONIN QUANT: CPT

## 2024-06-23 PROCEDURE — 85025 COMPLETE CBC W/AUTO DIFF WBC: CPT

## 2024-06-23 PROCEDURE — 93005 ELECTROCARDIOGRAM TRACING: CPT

## 2024-06-23 PROCEDURE — 83735 ASSAY OF MAGNESIUM: CPT

## 2024-06-23 PROCEDURE — 71045 X-RAY EXAM CHEST 1 VIEW: CPT | Performed by: RADIOLOGY

## 2024-06-23 PROCEDURE — 80053 COMPREHEN METABOLIC PANEL: CPT

## 2024-06-23 PROCEDURE — 36415 COLL VENOUS BLD VENIPUNCTURE: CPT

## 2024-06-23 PROCEDURE — 99283 EMERGENCY DEPT VISIT LOW MDM: CPT

## 2024-06-23 ASSESSMENT — LIFESTYLE VARIABLES
EVER FELT BAD OR GUILTY ABOUT YOUR DRINKING: NO
EVER HAD A DRINK FIRST THING IN THE MORNING TO STEADY YOUR NERVES TO GET RID OF A HANGOVER: NO
HAVE PEOPLE ANNOYED YOU BY CRITICIZING YOUR DRINKING: NO
HAVE YOU EVER FELT YOU SHOULD CUT DOWN ON YOUR DRINKING: NO
TOTAL SCORE: 0

## 2024-06-23 ASSESSMENT — PAIN - FUNCTIONAL ASSESSMENT: PAIN_FUNCTIONAL_ASSESSMENT: 0-10

## 2024-06-23 NOTE — ED PROVIDER NOTES
HPI   No chief complaint on file.      HPI  Is a 28-year-old male with history of suicidal ideation coming from [facility] who complains of intermittent chest pain throughout today.  Patient reports a left-sided, stabbing chest pain that does radiate to the left arm, reports associated shortness of breath.  Denies any history of MI or heart problems.  Denies any recent hospitalizations or surgeries.  Denies any recent travel or sick contacts.  Denies any blood thinner use.  Patient denies any recent illness, fever or chills, cough or congestion, headache or dizziness, abdominal pain or NVD, urinary symptoms.  Denies any recent medication changes.                  No data recorded                   Patient History   No past medical history on file.  No past surgical history on file.  No family history on file.  Social History     Tobacco Use    Smoking status: Every Day     Types: Cigarettes    Smokeless tobacco: Current   Vaping Use    Vaping status: Every Day   Substance Use Topics    Alcohol use: Not on file    Drug use: Not on file       Physical Exam   ED Triage Vitals [06/23/24 1830]   Temperature Heart Rate Respirations BP   36.8 °C (98.2 °F) 72 18 126/81      Pulse Ox Temp Source Heart Rate Source Patient Position   97 % Oral Monitor Lying      BP Location FiO2 (%)     Right arm --       Physical Exam  Vitals reviewed.   Constitutional:       Appearance: Normal appearance.   HENT:      Head: Normocephalic and atraumatic.   Eyes:      Extraocular Movements: Extraocular movements intact.   Cardiovascular:      Rate and Rhythm: Normal rate and regular rhythm.   Pulmonary:      Effort: Pulmonary effort is normal.      Breath sounds: Normal breath sounds.   Abdominal:      General: Abdomen is flat.      Palpations: Abdomen is soft.      Tenderness: There is no abdominal tenderness.   Musculoskeletal:         General: Normal range of motion.      Cervical back: Normal range of motion and neck supple.   Skin:      General: Skin is warm and dry.   Neurological:      General: No focal deficit present.      Mental Status: He is alert and oriented to person, place, and time.   Psychiatric:         Mood and Affect: Mood normal.         Behavior: Behavior normal.         ED Course & MDM   ED Course as of 06/23/24 2324   Sun Jun 23, 2024   1901 ECG 12 lead  ECG performed on June 23, 2024 at 6:48 PM and interpreted by me at 6:50 PM showing a normal sinus rhythm, no axis deviation, intervals within normal limits, no STEMI.  When compared with ECG from June 7, 2024 no significant changes. [EG]      ED Course User Index  [EG] Ronda Muñiz MD         Diagnoses as of 06/23/24 2324   Chest pain, unspecified type       Medical Decision Making  Parts of this chart have been completed using voice recognition software. Please excuse any errors of transcription.  My thought process and reason for plan has been formulated from the time that I saw the patient until the time of disposition and is not specific to one specific moment during their visit and furthermore my MDM encompasses this entire chart and not only this text box.    HPI:   Detailed above.    Exam:   A medically appropriate exam performed, outlined above, given the known history and presentation.    History obtained from:   Patient    EKG/Cardiac monitor:   Interpreted by attending physician, see their note for ED course for more detail.    Social Determinants of Health considered during this visit:       Medications given during visit:  Medications - No data to display     Diagnostic/tests:  Labs Reviewed   CBC WITH AUTO DIFFERENTIAL - Abnormal       Result Value    WBC 3.7 (*)     nRBC 0.0      RBC 4.60      Hemoglobin 14.0      Hematocrit 40.5 (*)     MCV 88      MCH 30.4      MCHC 34.6      RDW 12.7      Platelets 240      Neutrophils % 52.0      Immature Granulocytes %, Automated 0.3      Lymphocytes % 35.4      Monocytes % 7.5      Eosinophils % 4.3      Basophils  % 0.5      Neutrophils Absolute 1.94      Immature Granulocytes Absolute, Automated 0.01      Lymphocytes Absolute 1.32      Monocytes Absolute 0.28      Eosinophils Absolute 0.16      Basophils Absolute 0.02     COMPREHENSIVE METABOLIC PANEL - Abnormal    Glucose 94      Sodium 136      Potassium 4.0      Chloride 103      Bicarbonate 23 (*)     Urea Nitrogen 19      Creatinine 1.00      eGFR >90      Calcium 8.9      Albumin 4.2      Alkaline Phosphatase 78      Total Protein 6.2      AST 23      Bilirubin, Total <0.2      ALT 45 (*)     Anion Gap 10     MAGNESIUM - Normal    Magnesium 2.00     SERIAL TROPONIN, INITIAL ([facility]) - Normal    Troponin T, High Sensitivity <6     TROPONIN T SERIES, HIGH SENSITIVITY (0, 2 HR, 6 HR)    Narrative:     The following orders were created for panel order Troponin T Series, High Sensitivity (0, 2HR, 6HR).  Procedure                               Abnormality         Status                     ---------                               -----------         ------                     Serial Troponin, Initial...[[id number]]  Normal              Final result               Serial Troponin, 2 Hour ...[[id number]]                                                   Please view results for these tests on the individual orders.   SERIAL TROPONIN,  2 HOUR ([facility])      XR chest 1 view   Final Result   1.  No evidence of acute cardiopulmonary process.                  MACRO:   None        Signed by: Miles Gatica 6/23/2024 7:50 PM   Dictation workstation:   [id number]           Differential Diagnosis:   As I have deemed necessary from their history, physical and studies, I have considered the following diagnoses:     SUICIDAL IDEATION  HOMICIDAL IDEATION  SCHIZOPHRENIA  BIPOLAR DISORDER  MAJOR DEPRESSIVE DISORDER  GENERALIZED ANXIETY DISORDER  SUBSTANCE ABUSE  ALCOHOL ABUSE  ACUTE STRESS REACTION    Consultations:      Procedures:      Critical Care:      Referrals:      Discharge  Prescriptions:      MDM Summary:  Workup is unremarkable.  Patient became very aggressive towards staff.  He was de-escalated and discharged.  Patient was discharged in stable condition.    Procedure  Procedures     Phill Corey PA-C  06/23/24 6828

## 2024-06-27 LAB
ATRIAL RATE: 67 BPM
P AXIS: 70 DEGREES
P OFFSET: 189 MS
P ONSET: 146 MS
PR INTERVAL: 152 MS
Q ONSET: 222 MS
QRS COUNT: 11 BEATS
QRS DURATION: 100 MS
QT INTERVAL: 386 MS
QTC CALCULATION(BAZETT): 407 MS
QTC FREDERICIA: 400 MS
R AXIS: 83 DEGREES
T AXIS: 46 DEGREES
T OFFSET: 415 MS
VENTRICULAR RATE: 67 BPM

## 2024-11-01 ENCOUNTER — HOSPITAL ENCOUNTER (EMERGENCY)
Facility: HOSPITAL | Age: 29
Discharge: HOME | End: 2024-11-01
Attending: EMERGENCY MEDICINE
Payer: MEDICAID

## 2024-11-01 ENCOUNTER — APPOINTMENT (OUTPATIENT)
Dept: CARDIOLOGY | Facility: HOSPITAL | Age: 29
End: 2024-11-01
Payer: MEDICAID

## 2024-11-01 VITALS
DIASTOLIC BLOOD PRESSURE: 82 MMHG | OXYGEN SATURATION: 99 % | SYSTOLIC BLOOD PRESSURE: 136 MMHG | TEMPERATURE: 98.4 F | RESPIRATION RATE: 18 BRPM | HEART RATE: 80 BPM

## 2024-11-01 DIAGNOSIS — R33.9 URINARY RETENTION: Primary | ICD-10-CM

## 2024-11-01 LAB
ALBUMIN SERPL BCP-MCNC: 4.6 G/DL (ref 3.4–5)
ALP SERPL-CCNC: 71 U/L (ref 33–120)
ALT SERPL W P-5'-P-CCNC: 26 U/L (ref 10–52)
AMPHETAMINES UR QL SCN: NORMAL
ANION GAP SERPL CALC-SCNC: 10 MMOL/L (ref 10–20)
APPEARANCE UR: CLEAR
AST SERPL W P-5'-P-CCNC: 19 U/L (ref 9–39)
BARBITURATES UR QL SCN: NORMAL
BASOPHILS # BLD AUTO: 0.02 X10*3/UL (ref 0–0.1)
BASOPHILS NFR BLD AUTO: 0.3 %
BENZODIAZ UR QL SCN: NORMAL
BILIRUB SERPL-MCNC: 0.4 MG/DL (ref 0–1.2)
BILIRUB UR STRIP.AUTO-MCNC: NEGATIVE MG/DL
BUN SERPL-MCNC: 10 MG/DL (ref 6–23)
BZE UR QL SCN: NORMAL
CALCIUM SERPL-MCNC: 9.2 MG/DL (ref 8.6–10.3)
CANNABINOIDS UR QL SCN: NORMAL
CHLORIDE SERPL-SCNC: 107 MMOL/L (ref 98–107)
CO2 SERPL-SCNC: 26 MMOL/L (ref 21–32)
COLOR UR: NORMAL
CREAT SERPL-MCNC: 1.27 MG/DL (ref 0.5–1.3)
EGFRCR SERPLBLD CKD-EPI 2021: 79 ML/MIN/1.73M*2
EOSINOPHIL # BLD AUTO: 0.06 X10*3/UL (ref 0–0.7)
EOSINOPHIL NFR BLD AUTO: 0.8 %
ERYTHROCYTE [DISTWIDTH] IN BLOOD BY AUTOMATED COUNT: 12.8 % (ref 11.5–14.5)
FENTANYL+NORFENTANYL UR QL SCN: NORMAL
GLUCOSE SERPL-MCNC: 90 MG/DL (ref 74–99)
GLUCOSE UR STRIP.AUTO-MCNC: NORMAL MG/DL
HCT VFR BLD AUTO: 41.2 % (ref 41–52)
HGB BLD-MCNC: 14.9 G/DL (ref 13.5–17.5)
IMM GRANULOCYTES # BLD AUTO: 0.03 X10*3/UL (ref 0–0.7)
IMM GRANULOCYTES NFR BLD AUTO: 0.4 % (ref 0–0.9)
KETONES UR STRIP.AUTO-MCNC: NEGATIVE MG/DL
LEUKOCYTE ESTERASE UR QL STRIP.AUTO: NEGATIVE
LYMPHOCYTES # BLD AUTO: 0.93 X10*3/UL (ref 1.2–4.8)
LYMPHOCYTES NFR BLD AUTO: 11.7 %
MAGNESIUM SERPL-MCNC: 2.06 MG/DL (ref 1.6–2.4)
MCH RBC QN AUTO: 30.3 PG (ref 26–34)
MCHC RBC AUTO-ENTMCNC: 36.2 G/DL (ref 32–36)
MCV RBC AUTO: 84 FL (ref 80–100)
METHADONE UR QL SCN: NORMAL
MONOCYTES # BLD AUTO: 0.29 X10*3/UL (ref 0.1–1)
MONOCYTES NFR BLD AUTO: 3.6 %
NEUTROPHILS # BLD AUTO: 6.65 X10*3/UL (ref 1.2–7.7)
NEUTROPHILS NFR BLD AUTO: 83.2 %
NITRITE UR QL STRIP.AUTO: NEGATIVE
NRBC BLD-RTO: 0 /100 WBCS (ref 0–0)
OPIATES UR QL SCN: NORMAL
OXYCODONE+OXYMORPHONE UR QL SCN: NORMAL
PCP UR QL SCN: NORMAL
PH UR STRIP.AUTO: 7 [PH]
PLATELET # BLD AUTO: 249 X10*3/UL (ref 150–450)
POTASSIUM SERPL-SCNC: 3.7 MMOL/L (ref 3.5–5.3)
PROT SERPL-MCNC: 7 G/DL (ref 6.4–8.2)
PROT UR STRIP.AUTO-MCNC: NEGATIVE MG/DL
RBC # BLD AUTO: 4.92 X10*6/UL (ref 4.5–5.9)
RBC # UR STRIP.AUTO: NEGATIVE /UL
SODIUM SERPL-SCNC: 139 MMOL/L (ref 136–145)
SP GR UR STRIP.AUTO: 1.01
UROBILINOGEN UR STRIP.AUTO-MCNC: NORMAL MG/DL
WBC # BLD AUTO: 8 X10*3/UL (ref 4.4–11.3)

## 2024-11-01 PROCEDURE — 93005 ELECTROCARDIOGRAM TRACING: CPT

## 2024-11-01 PROCEDURE — 51798 US URINE CAPACITY MEASURE: CPT

## 2024-11-01 PROCEDURE — 81003 URINALYSIS AUTO W/O SCOPE: CPT

## 2024-11-01 PROCEDURE — 85025 COMPLETE CBC W/AUTO DIFF WBC: CPT

## 2024-11-01 PROCEDURE — 83735 ASSAY OF MAGNESIUM: CPT

## 2024-11-01 PROCEDURE — 80307 DRUG TEST PRSMV CHEM ANLYZR: CPT

## 2024-11-01 PROCEDURE — 80053 COMPREHEN METABOLIC PANEL: CPT

## 2024-11-01 PROCEDURE — 87491 CHLMYD TRACH DNA AMP PROBE: CPT | Mod: AHULAB

## 2024-11-01 PROCEDURE — 99283 EMERGENCY DEPT VISIT LOW MDM: CPT | Mod: 25 | Performed by: EMERGENCY MEDICINE

## 2024-11-01 PROCEDURE — 36415 COLL VENOUS BLD VENIPUNCTURE: CPT

## 2024-11-01 ASSESSMENT — PAIN - FUNCTIONAL ASSESSMENT: PAIN_FUNCTIONAL_ASSESSMENT: 0-10

## 2024-11-01 ASSESSMENT — PAIN DESCRIPTION - LOCATION: LOCATION: PELVIS

## 2024-11-01 ASSESSMENT — PAIN DESCRIPTION - ORIENTATION: ORIENTATION: MID;LOWER

## 2024-11-01 ASSESSMENT — COLUMBIA-SUICIDE SEVERITY RATING SCALE - C-SSRS
1. IN THE PAST MONTH, HAVE YOU WISHED YOU WERE DEAD OR WISHED YOU COULD GO TO SLEEP AND NOT WAKE UP?: NO
2. HAVE YOU ACTUALLY HAD ANY THOUGHTS OF KILLING YOURSELF?: NO
6. HAVE YOU EVER DONE ANYTHING, STARTED TO DO ANYTHING, OR PREPARED TO DO ANYTHING TO END YOUR LIFE?: NO

## 2024-11-01 ASSESSMENT — PAIN DESCRIPTION - PAIN TYPE: TYPE: ACUTE PAIN

## 2024-11-01 ASSESSMENT — PAIN SCALES - GENERAL: PAINLEVEL_OUTOF10: 4

## 2024-11-02 LAB
C TRACH RRNA SPEC QL NAA+PROBE: NEGATIVE
N GONORRHOEA DNA SPEC QL PROBE+SIG AMP: NEGATIVE

## 2024-11-04 LAB
ATRIAL RATE: 82 BPM
P AXIS: -20 DEGREES
P OFFSET: 183 MS
P ONSET: 135 MS
PR INTERVAL: 146 MS
Q ONSET: 208 MS
QRS COUNT: 14 BEATS
QRS DURATION: 98 MS
QT INTERVAL: 360 MS
QTC CALCULATION(BAZETT): 420 MS
QTC FREDERICIA: 399 MS
R AXIS: -33 DEGREES
T AXIS: -13 DEGREES
T OFFSET: 388 MS
VENTRICULAR RATE: 82 BPM

## 2024-11-14 ENCOUNTER — HOSPITAL ENCOUNTER (EMERGENCY)
Facility: HOSPITAL | Age: 29
Discharge: LEFT BEFORE DISCHARGE | End: 2024-11-14
Attending: STUDENT IN AN ORGANIZED HEALTH CARE EDUCATION/TRAINING PROGRAM
Payer: MEDICAID

## 2024-11-14 VITALS
HEART RATE: 89 BPM | HEIGHT: 67 IN | RESPIRATION RATE: 18 BRPM | TEMPERATURE: 99.6 F | OXYGEN SATURATION: 98 % | WEIGHT: 130 LBS | SYSTOLIC BLOOD PRESSURE: 157 MMHG | DIASTOLIC BLOOD PRESSURE: 95 MMHG | BODY MASS INDEX: 20.4 KG/M2

## 2024-11-14 DIAGNOSIS — R33.9 URINARY RETENTION: Primary | ICD-10-CM

## 2024-11-14 DIAGNOSIS — Z46.6 ENCOUNTER FOR FOLEY CATHETER REMOVAL: ICD-10-CM

## 2024-11-14 PROCEDURE — 99283 EMERGENCY DEPT VISIT LOW MDM: CPT

## 2024-11-14 PROCEDURE — 2500000004 HC RX 250 GENERAL PHARMACY W/ HCPCS (ALT 636 FOR OP/ED): Performed by: STUDENT IN AN ORGANIZED HEALTH CARE EDUCATION/TRAINING PROGRAM

## 2024-11-14 PROCEDURE — 96372 THER/PROPH/DIAG INJ SC/IM: CPT | Performed by: STUDENT IN AN ORGANIZED HEALTH CARE EDUCATION/TRAINING PROGRAM

## 2024-11-14 RX ORDER — KETOROLAC TROMETHAMINE 30 MG/ML
15 INJECTION, SOLUTION INTRAMUSCULAR; INTRAVENOUS ONCE
Status: COMPLETED | OUTPATIENT
Start: 2024-11-14 | End: 2024-11-14

## 2024-11-14 ASSESSMENT — PAIN DESCRIPTION - LOCATION: LOCATION: PENIS

## 2024-11-14 ASSESSMENT — PAIN SCALES - GENERAL
PAINLEVEL_OUTOF10: 6
PAINLEVEL_OUTOF10: 6

## 2024-11-14 ASSESSMENT — PAIN DESCRIPTION - PAIN TYPE: TYPE: ACUTE PAIN

## 2024-11-14 ASSESSMENT — COLUMBIA-SUICIDE SEVERITY RATING SCALE - C-SSRS
6. HAVE YOU EVER DONE ANYTHING, STARTED TO DO ANYTHING, OR PREPARED TO DO ANYTHING TO END YOUR LIFE?: NO
1. IN THE PAST MONTH, HAVE YOU WISHED YOU WERE DEAD OR WISHED YOU COULD GO TO SLEEP AND NOT WAKE UP?: NO
2. HAVE YOU ACTUALLY HAD ANY THOUGHTS OF KILLING YOURSELF?: NO

## 2024-11-14 ASSESSMENT — PAIN - FUNCTIONAL ASSESSMENT: PAIN_FUNCTIONAL_ASSESSMENT: 0-10

## 2024-11-14 NOTE — ED TRIAGE NOTES
Pt c/o pain from the urinary catheter. Pt states he had it placed at [facility] for urinary retention on 11/8 Friday.

## 2024-11-14 NOTE — ED PROVIDER NOTES
"[facility] ED Note    Date of Service: 11/14/2024  Reason for Visit: catheter issues      Patient History     HPI  Renan Zhu is a 29 y.o. male with past medical history of polysubstance abuse as well as urinary retention oh (unclear reason) presents emergency department for void trial.  Patient states that he was seen last week for urinary retention, was able to void spontaneously at that time.  Subsequently return to the emergency room with urinary retention, unable to void and a Dumont catheter was placed.  Urinalysis on both ED visits were negative for any signs of infection.  Patient states that he was supposed to have urology appointment today, although got the locations mixed up as his appointment was changed but was not communicated to him.  His treatment facility which he resides is concern for possible CLABSI and therefore is here for a void trial.  Patient states that it was not difficult to place the Dumont and he is otherwise felt well with the exception of some sensitivity around the Dumont.  Particularly no fever or nausea and vomiting.      Past Medical History:   Diagnosis Date    ADD (attention deficit disorder)     ADHD     Bipolar 2 disorder (Multi)     Depression      History reviewed. No pertinent surgical history.      Physical Exam     Vitals:    11/14/24 1009   BP: (!) 157/95   BP Location: Left arm   Patient Position: Sitting   Pulse: 89   Resp: 18   Temp: 37.6 °C (99.6 °F)   TempSrc: Temporal   SpO2: 98%   Weight: 59 kg (130 lb)   Height: 1.702 m (5' 7\")     General: Age-appropriate male, nontoxic, sitting comfortably in the gurney no acute distress.  Pulmonary:   Non-labored breathing.  Symmetric chest rise  Cardiac:  Regular rate   Abdomen:  Soft. Non-tender. Non-distended  Musculoskeletal:  No peripheral edema   Skin:  Dry, no rashes  Neuro: Alert and oriented x 4.  Moves all 4 extremity spontaneously.  5/5 strength in bilateral lower extremities.  Sensation intact light " touch.    Diagnostic Studies     Labs:  Please see EMR for labs obtained during this patient encounter.    Radiology:  Please see EMR for imaging obtained during this patient encounter.    ED Course and MDM     Renan Zhu is a 29 y.o. male with a history and presentation as described above in HPI.      Upon presentation, the patient was afebrile, well-appearing, with unremarkable vital signs.  Patient presented to the emergency department for a voiding trial in the setting of previous urinary retention.  Differential diagnosis for the urinary retention includes BPH, cauda equina syndrome, drug toxicity, or neurogenic bladder.  Overall unclear reason for patient's urinary retention.  I have low suspicion for cauda equina syndrome given absence of back pain, no saddle anesthesia, bowel or bladder incontinence or lower extremity weakness or sensory changes.  Patient had intact lower extremity neurological exam.  Patient does not have any sign/symptoms to suggest infection.  His last 2 urinalyses in the emergency department last 2 weeks did not show any signs of UTI and he was checked for chlamydia gonorrhea.  We did plan to do a voiding trial for this patient, we removed his Dumont catheter, patient was able to urinate spontaneously without difficulty.  He was given Toradol per request for pain during removal of his catheter.  Plan was to obtain a postvoid bladder scan to evaluate for any postvoid residual bladder volume, however patient eloped prior to obtaining this scan.  I did attempt to call the patient twice, went to voicemail with an unidentified voicemail, therefore unable to leave a voicemail.  I have low suspicion for patient having any emergent needs requiring representation in the emergency department.  Given that he was able to void spontaneously, he can follow-up with urology at his outpatient appointment.      Impression     Diagnoses as of 11/14/24 1156   Urinary retention   Encounter for Dumont  catheter removal        Plan     Eloped    Renan Duff MD  [facility] Emergency Medicine         Renan Duff MD  11/14/24 3912

## 2024-11-14 NOTE — PROGRESS NOTES
I was able to call patient back as he called the ED.  I called him back at 1425.  Patient informing that he actually lied to the nurse, stated he was unable to void since getting the Dumont removed.  Because of this information, I did urge patient to return to the emergency department immediately.  I discussed with the patient the risks of not coming back to the emergency department including bladder perforation, urinary tract infection, and kidney injury.  Patient was able to reiterate these risks to me and stated that he plan to return to the emergency department for Dumont replacement.

## 2025-01-14 ENCOUNTER — HOSPITAL ENCOUNTER (EMERGENCY)
Facility: HOSPITAL | Age: 30
Discharge: HOME | End: 2025-01-14
Attending: PHYSICIAN ASSISTANT
Payer: MEDICAID

## 2025-01-14 ENCOUNTER — APPOINTMENT (OUTPATIENT)
Dept: RADIOLOGY | Facility: HOSPITAL | Age: 30
End: 2025-01-14
Payer: MEDICAID

## 2025-01-14 VITALS
RESPIRATION RATE: 18 BRPM | OXYGEN SATURATION: 98 % | HEART RATE: 71 BPM | DIASTOLIC BLOOD PRESSURE: 84 MMHG | WEIGHT: 140 LBS | TEMPERATURE: 99.3 F | HEIGHT: 66 IN | SYSTOLIC BLOOD PRESSURE: 133 MMHG | BODY MASS INDEX: 22.5 KG/M2

## 2025-01-14 DIAGNOSIS — J20.9 ACUTE BRONCHITIS, UNSPECIFIED ORGANISM: Primary | ICD-10-CM

## 2025-01-14 LAB
FLUAV RNA RESP QL NAA+PROBE: NOT DETECTED
FLUBV RNA RESP QL NAA+PROBE: NOT DETECTED
SARS-COV-2 RNA RESP QL NAA+PROBE: NOT DETECTED

## 2025-01-14 PROCEDURE — 94640 AIRWAY INHALATION TREATMENT: CPT

## 2025-01-14 PROCEDURE — 2500000004 HC RX 250 GENERAL PHARMACY W/ HCPCS (ALT 636 FOR OP/ED): Performed by: PHYSICIAN ASSISTANT

## 2025-01-14 PROCEDURE — 87636 SARSCOV2 & INF A&B AMP PRB: CPT | Performed by: EMERGENCY MEDICINE

## 2025-01-14 PROCEDURE — 99284 EMERGENCY DEPT VISIT MOD MDM: CPT | Mod: 25 | Performed by: PHYSICIAN ASSISTANT

## 2025-01-14 PROCEDURE — 2500000002 HC RX 250 W HCPCS SELF ADMINISTERED DRUGS (ALT 637 FOR MEDICARE OP, ALT 636 FOR OP/ED): Performed by: PHYSICIAN ASSISTANT

## 2025-01-14 PROCEDURE — 71046 X-RAY EXAM CHEST 2 VIEWS: CPT | Mod: FOREIGN READ | Performed by: RADIOLOGY

## 2025-01-14 PROCEDURE — 71046 X-RAY EXAM CHEST 2 VIEWS: CPT

## 2025-01-14 RX ORDER — BROMPHENIRAMINE MALEATE, PSEUDOEPHEDRINE HYDROCHLORIDE, AND DEXTROMETHORPHAN HYDROBROMIDE 2; 30; 10 MG/5ML; MG/5ML; MG/5ML
5 SYRUP ORAL 4 TIMES DAILY PRN
Qty: 118 ML | Refills: 0 | Status: SHIPPED | OUTPATIENT
Start: 2025-01-14 | End: 2025-01-19

## 2025-01-14 RX ORDER — ALBUTEROL SULFATE 90 UG/1
2 INHALANT RESPIRATORY (INHALATION) EVERY 4 HOURS PRN
Qty: 18 G | Refills: 0 | Status: SHIPPED | OUTPATIENT
Start: 2025-01-14 | End: 2025-02-13

## 2025-01-14 RX ORDER — PREDNISONE 20 MG/1
40 TABLET ORAL ONCE
Status: COMPLETED | OUTPATIENT
Start: 2025-01-14 | End: 2025-01-14

## 2025-01-14 RX ORDER — IPRATROPIUM BROMIDE AND ALBUTEROL SULFATE 2.5; .5 MG/3ML; MG/3ML
3 SOLUTION RESPIRATORY (INHALATION) ONCE
Status: COMPLETED | OUTPATIENT
Start: 2025-01-14 | End: 2025-01-14

## 2025-01-14 RX ADMIN — IPRATROPIUM BROMIDE AND ALBUTEROL SULFATE 3 ML: 2.5; .5 SOLUTION RESPIRATORY (INHALATION) at 09:28

## 2025-01-14 RX ADMIN — PREDNISONE 40 MG: 20 TABLET ORAL at 09:28

## 2025-01-14 ASSESSMENT — COLUMBIA-SUICIDE SEVERITY RATING SCALE - C-SSRS
2. HAVE YOU ACTUALLY HAD ANY THOUGHTS OF KILLING YOURSELF?: NO
1. IN THE PAST MONTH, HAVE YOU WISHED YOU WERE DEAD OR WISHED YOU COULD GO TO SLEEP AND NOT WAKE UP?: NO
6. HAVE YOU EVER DONE ANYTHING, STARTED TO DO ANYTHING, OR PREPARED TO DO ANYTHING TO END YOUR LIFE?: NO

## 2025-01-14 ASSESSMENT — PAIN DESCRIPTION - LOCATION: LOCATION: CHEST

## 2025-01-14 ASSESSMENT — PAIN - FUNCTIONAL ASSESSMENT: PAIN_FUNCTIONAL_ASSESSMENT: 0-10

## 2025-01-14 ASSESSMENT — PAIN SCALES - GENERAL: PAINLEVEL_OUTOF10: 4

## 2025-01-14 NOTE — Clinical Note
Renan Cernawijillian was seen and treated in our emergency department on 1/14/2025.  He may return to work on 01/16/2025.       If you have any questions or concerns, please don't hesitate to call.      Toni White PA-C

## 2025-01-14 NOTE — ED TRIAGE NOTES
Patient sent in for flu like symptoms, [email or web address] is in a treatment center and was sent in to make sure everything is ok, patient states covid ran through the facility

## 2025-01-14 NOTE — ED PROVIDER NOTES
HPI   Chief Complaint   Patient presents with    Flu Symptoms       History of present illness: 29-year-old male with history of bipolar 2 and ADHD complains of a cough for the past 4 days.  Cough is occasionally productive with clear sputum.  Has associated rhinorrhea and congestion..  Has associated fevers and chills.  Has some chest discomfort with coughing.  Pain is 4 of 10 aching intermittent.  Denies abdominal pain nausea vomiting neck pain neck stiffness headache lightheadedness dizziness shortness of breath.  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  He indicates other individuals are sick recently.    Review of systems: Constitutional, eye, ENT, cardiovascular, respiratory, gastrointestinal, genitourinary, neurologic, musculoskeletal, dermatologic, hematologic, endocrine systems were evaluated and were negative unless otherwise specified in history of present illness.    Medications: Reviewed and per nursing note.    Family history: Denies relevant medical conditions.      Physical exam:    Appearance: Well-developed, well-nourished, nontoxic-appearing, alert and oriented x3. Talking in complete sentences.    HEENT: Inflamed nasal turbinates.  Head normocephalic atraumatic, extraocular movements intact, pupils equal round reactive to light, mucous membranes are moist and pink.  Normal tympanic membranes.    NECK:  Nml Inspection, no meningismus, no thyromegaly, no lymphadenopathy, no JVD, trachea is midline.    Respiratory: Scattered expiratory wheezing with no rhonchi or crackles.  Normal bilateral excursion.    Cardiovascular: Regular rate and rhythm, no murmurs, rubs or gallops. Pulses 2+ symmetrically in the dorsalis pedis and radial pulses.    Abdomen/GI:  Soft, nontender, nondistended, normal bowel sounds x4. No masses or organomegaly.    :  No CVA tenderness    Neuro:  Oriented x 3, Speech Clear, cranial nerves grossly intact. Normal sensation to light touch in all 4  extremities.    Musculoskeletal: Patient spontaneously moves all 4 extremities.    Skin:  No cyanosis, clubbing, edema, open wounds, or rashes.            Patient History   Past Medical History:   Diagnosis Date    ADD (attention deficit disorder)     ADHD     Bipolar 2 disorder (Multi)     Depression      No past surgical history on file.  No family history on file.  Social History     Tobacco Use    Smoking status: Every Day     Current packs/day: 0.25     Average packs/day: 0.3 packs/day for 15.0 years (3.8 ttl pk-yrs)     Types: Cigarettes     Passive exposure: Current    Smokeless tobacco: Current   Vaping Use    Vaping status: Every Day    Substances: Nicotine    Passive vaping exposure: Yes   Substance Use Topics    Alcohol use: Not Currently     Comment: Hx of alcohol abuse, Hx detox, last drink June    Drug use: Not Currently     Types: Marijuana, Cocaine, Opium     Comment: last use in June       Physical Exam   ED Triage Vitals [01/14/25 0851]   Temperature Heart Rate Respirations BP   37.4 °C (99.3 °F) 67 18 134/87      Pulse Ox Temp src Heart Rate Source Patient Position   97 % -- -- --      BP Location FiO2 (%)     -- --       Physical Exam      ED Course & MDM   Diagnoses as of 01/14/25 1038   Acute bronchitis, unspecified organism                 No data recorded     [place] Coma Scale Score: 15 (01/14/25 0849 : Anton Hoffman RN)                           Medical Decision Making  XR chest 2 views   Final Result    No acute findings.      Signed by Oswaldo Salazar MD       Labs Reviewed  SARS-COV-2 AND INFLUENZA A/B PCR - Normal      Patient complains of cough and congestion.  Differential diagnosis of COVID-19, influenza, pneumonia, otitis media, tonsillitis, meningitis, sinusitis.  Examination shows normal tympanic membranes, no meningeal signs, no sinus tenderness making otitis media, meningitis, sinusitis unlikely.   Has some chest discomfort with coughing.  No shortness of breath.  Normal pulse  oximetry.  Expiratory scattered wheezing with rhinorrhea congestion.    Chest x-ray influenza COVID-19 swabs ordered.  Given DuoNeb breathing treatment and prednisone.    Chest x-ray and swabs unremarkable.  Presentation most consistent with viral upper restaurant tract infection or viral bronchitis.  Given description for Bromfed and albuterol inhaler.  Patient requests to be out of group therapy for couple days.    Patient will be discharged to home with prescription.  Patient is educated in signs and symptoms of worsening symptoms and reasons to come back to the emergency department.  Will need to follow up with primary care provider.  Patient does not report social determinants of health impacting ability to obtain care that is needed.  Patient agrees with plan.    This is a transcription.  Text was reviewed for errors, but some transcription errors may remain.  Please call for any questions.          Procedure  Procedures     Toni White PA-C  01/14/25 1038